# Patient Record
Sex: FEMALE | Race: WHITE | NOT HISPANIC OR LATINO | Employment: FULL TIME | URBAN - METROPOLITAN AREA
[De-identification: names, ages, dates, MRNs, and addresses within clinical notes are randomized per-mention and may not be internally consistent; named-entity substitution may affect disease eponyms.]

---

## 2020-10-07 ENCOUNTER — HOSPITAL ENCOUNTER (EMERGENCY)
Facility: HOSPITAL | Age: 25
Discharge: HOME | End: 2020-10-07
Attending: EMERGENCY MEDICINE | Admitting: EMERGENCY MEDICINE

## 2020-10-07 VITALS
WEIGHT: 190 LBS | SYSTOLIC BLOOD PRESSURE: 115 MMHG | TEMPERATURE: 97.5 F | OXYGEN SATURATION: 100 % | HEART RATE: 74 BPM | BODY MASS INDEX: 33.66 KG/M2 | RESPIRATION RATE: 18 BRPM | HEIGHT: 63 IN | DIASTOLIC BLOOD PRESSURE: 75 MMHG

## 2020-10-07 DIAGNOSIS — R55 SYNCOPE, UNSPECIFIED SYNCOPE TYPE: Primary | ICD-10-CM

## 2020-10-07 LAB
ALBUMIN SERPL-MCNC: 4.3 G/DL (ref 3.5–5.2)
ALBUMIN/GLOB SERPL: 1.4 G/DL
ALP SERPL-CCNC: 90 U/L (ref 39–117)
ALT SERPL W P-5'-P-CCNC: 23 U/L (ref 1–33)
ANION GAP SERPL CALCULATED.3IONS-SCNC: 12 MMOL/L (ref 5–15)
AST SERPL-CCNC: 18 U/L (ref 1–32)
B-HCG UR QL: NEGATIVE
BASOPHILS # BLD AUTO: 0.05 10*3/MM3 (ref 0–0.2)
BASOPHILS NFR BLD AUTO: 0.5 % (ref 0–1.5)
BILIRUB SERPL-MCNC: 0.5 MG/DL (ref 0–1.2)
BUN SERPL-MCNC: 8 MG/DL (ref 6–20)
BUN/CREAT SERPL: 10.3 (ref 7–25)
CALCIUM SPEC-SCNC: 9 MG/DL (ref 8.6–10.5)
CHLORIDE SERPL-SCNC: 105 MMOL/L (ref 98–107)
CO2 SERPL-SCNC: 21 MMOL/L (ref 22–29)
CREAT SERPL-MCNC: 0.78 MG/DL (ref 0.57–1)
DEPRECATED RDW RBC AUTO: 42.9 FL (ref 37–54)
EOSINOPHIL # BLD AUTO: 0.16 10*3/MM3 (ref 0–0.4)
EOSINOPHIL NFR BLD AUTO: 1.6 % (ref 0.3–6.2)
ERYTHROCYTE [DISTWIDTH] IN BLOOD BY AUTOMATED COUNT: 13 % (ref 12.3–15.4)
GFR SERPL CREATININE-BSD FRML MDRD: 92 ML/MIN/1.73
GLOBULIN UR ELPH-MCNC: 3 GM/DL
GLUCOSE SERPL-MCNC: 95 MG/DL (ref 65–99)
HCT VFR BLD AUTO: 45.5 % (ref 34–46.6)
HGB BLD-MCNC: 14.4 G/DL (ref 12–15.9)
HOLD SPECIMEN: NORMAL
HOLD SPECIMEN: NORMAL
IMM GRANULOCYTES # BLD AUTO: 0.04 10*3/MM3 (ref 0–0.05)
IMM GRANULOCYTES NFR BLD AUTO: 0.4 % (ref 0–0.5)
LYMPHOCYTES # BLD AUTO: 2.41 10*3/MM3 (ref 0.7–3.1)
LYMPHOCYTES NFR BLD AUTO: 24.6 % (ref 19.6–45.3)
MAGNESIUM SERPL-MCNC: 2 MG/DL (ref 1.6–2.6)
MCH RBC QN AUTO: 28.5 PG (ref 26.6–33)
MCHC RBC AUTO-ENTMCNC: 31.6 G/DL (ref 31.5–35.7)
MCV RBC AUTO: 89.9 FL (ref 79–97)
MONOCYTES # BLD AUTO: 0.6 10*3/MM3 (ref 0.1–0.9)
MONOCYTES NFR BLD AUTO: 6.1 % (ref 5–12)
NEUTROPHILS NFR BLD AUTO: 6.52 10*3/MM3 (ref 1.7–7)
NEUTROPHILS NFR BLD AUTO: 66.8 % (ref 42.7–76)
NRBC BLD AUTO-RTO: 0 /100 WBC (ref 0–0.2)
PLATELET # BLD AUTO: 263 10*3/MM3 (ref 140–450)
PMV BLD AUTO: 11 FL (ref 6–12)
POTASSIUM SERPL-SCNC: 4.1 MMOL/L (ref 3.5–5.2)
PROT SERPL-MCNC: 7.3 G/DL (ref 6–8.5)
RBC # BLD AUTO: 5.06 10*6/MM3 (ref 3.77–5.28)
SODIUM SERPL-SCNC: 138 MMOL/L (ref 136–145)
TROPONIN T SERPL-MCNC: <0.01 NG/ML (ref 0–0.03)
WBC # BLD AUTO: 9.78 10*3/MM3 (ref 3.4–10.8)
WHOLE BLOOD HOLD SPECIMEN: NORMAL
WHOLE BLOOD HOLD SPECIMEN: NORMAL

## 2020-10-07 PROCEDURE — 93005 ELECTROCARDIOGRAM TRACING: CPT | Performed by: EMERGENCY MEDICINE

## 2020-10-07 PROCEDURE — 81025 URINE PREGNANCY TEST: CPT | Performed by: EMERGENCY MEDICINE

## 2020-10-07 PROCEDURE — 80053 COMPREHEN METABOLIC PANEL: CPT | Performed by: EMERGENCY MEDICINE

## 2020-10-07 PROCEDURE — 84484 ASSAY OF TROPONIN QUANT: CPT | Performed by: EMERGENCY MEDICINE

## 2020-10-07 PROCEDURE — 85025 COMPLETE CBC W/AUTO DIFF WBC: CPT | Performed by: EMERGENCY MEDICINE

## 2020-10-07 PROCEDURE — 83735 ASSAY OF MAGNESIUM: CPT | Performed by: EMERGENCY MEDICINE

## 2020-10-07 PROCEDURE — 99283 EMERGENCY DEPT VISIT LOW MDM: CPT

## 2020-10-07 PROCEDURE — 93005 ELECTROCARDIOGRAM TRACING: CPT

## 2020-10-07 RX ORDER — SODIUM CHLORIDE 0.9 % (FLUSH) 0.9 %
10 SYRINGE (ML) INJECTION AS NEEDED
Status: DISCONTINUED | OUTPATIENT
Start: 2020-10-07 | End: 2020-10-07 | Stop reason: HOSPADM

## 2020-10-08 NOTE — ED PROVIDER NOTES
"Subjective   25-year-old female presents for evaluation following syncopal episode tonight.  Of note, the patient is a nurse upstairs.  She is otherwise healthy and denies any prior history of syncope or family history of sudden cardiac death.  Tonight, just after 7 PM she was receiving report when she got lightheaded and \"passed out.\"  She denies any preceding chest pain, shortness of breath, palpitations, or known triggers for her symptoms.  She was separately brought to the emergency department to be evaluated.  She did not hit her head or lose consciousness.  Currently, she states that she feels much better and is not complaining of any symptoms at this time.          Review of Systems   Neurological: Positive for syncope.   All other systems reviewed and are negative.      No past medical history on file.    No Known Allergies    No past surgical history on file.    No family history on file.    Social History     Socioeconomic History   • Marital status: Single     Spouse name: Not on file   • Number of children: Not on file   • Years of education: Not on file   • Highest education level: Not on file           Objective   Physical Exam  Vitals signs and nursing note reviewed.   Constitutional:       General: She is not in acute distress.     Appearance: Normal appearance. She is well-developed. She is not diaphoretic.      Comments: Well-appearing female in no acute distress   HENT:      Head: Normocephalic and atraumatic.   Eyes:      Pupils: Pupils are equal, round, and reactive to light.   Neck:      Musculoskeletal: Normal range of motion.      Comments: No midline cervical spine tenderness to palpation, no step-off or deformity noted  Cardiovascular:      Rate and Rhythm: Normal rate and regular rhythm.      Pulses: Normal pulses.      Heart sounds: Normal heart sounds. No murmur. No friction rub. No gallop.    Pulmonary:      Effort: Pulmonary effort is normal. No respiratory distress.      Breath " sounds: Normal breath sounds. No wheezing or rales.   Abdominal:      General: Bowel sounds are normal. There is no distension.      Palpations: Abdomen is soft. There is no mass.      Tenderness: There is no abdominal tenderness. There is no guarding or rebound.   Musculoskeletal: Normal range of motion.      Right lower leg: No edema.      Left lower leg: No edema.   Skin:     General: Skin is warm and dry.      Findings: No erythema or rash.   Neurological:      General: No focal deficit present.      Mental Status: She is alert and oriented to person, place, and time.      Comments: Awake, alert, and oriented x3, clear and fluent speech, no ataxia or dysmetria, normal gait, neurovascularly intact distally in all fours with bounding distal pulses and normal sensation, 5 out of 5 strength in all fours, no cranial nerve deficits noted with cranial nerves II through XII grossly intact   Psychiatric:         Mood and Affect: Mood normal.         Thought Content: Thought content normal.         Judgment: Judgment normal.         Procedures           ED Course  ED Course as of Oct 08 0003   Thu Oct 08, 2020   0001 25-year-old female presents for evaluation following syncopal episode tonight.  Of note, the patient is a nurse upstairs.  She denies any preceding chest pain, shortness of breath, palpitations, or other symptoms.  No prior history of syncope.  No family history of sudden cardiac death.  On arrival to the ED, the patient is nontoxic-appearing.  Vital signs are reassuring.  Initial EKG revealed normal sinus rhythm with a heart rate of 95 and no ST segments suggestive of or concerning for ischemia.  Normal NC and QT intervals noted with no EKG evidence of Brugada syndrome or hypertrophic cardiomyopathy.  No family history of sudden cardiac death.  Labs are unrevealing.  Doubt pulmonary embolism based on clinical presentation.  CHESS negative.  Patient observed in the emergency department and remained  asymptomatic with no arrhythmias noted on telemetry monitoring.  I feel that she is appropriate for discharge and prompt outpatient cardiology follow-up with our heart valve clinic for her syncopal episode.  She will follow-up within the next 72 hours.  Agreeable with plan and given appropriate strict return precautions.  Encouraged oral rehydration.    [DD]      ED Course User Index  [DD] Robin Clement MD                                 Recent Results (from the past 24 hour(s))   Pregnancy, Urine - Urine, Clean Catch    Collection Time: 10/07/20  8:08 PM    Specimen: Urine, Clean Catch   Result Value Ref Range    HCG, Urine QL Negative Negative   Comprehensive Metabolic Panel    Collection Time: 10/07/20  8:22 PM    Specimen: Blood   Result Value Ref Range    Glucose 95 65 - 99 mg/dL    BUN 8 6 - 20 mg/dL    Creatinine 0.78 0.57 - 1.00 mg/dL    Sodium 138 136 - 145 mmol/L    Potassium 4.1 3.5 - 5.2 mmol/L    Chloride 105 98 - 107 mmol/L    CO2 21.0 (L) 22.0 - 29.0 mmol/L    Calcium 9.0 8.6 - 10.5 mg/dL    Total Protein 7.3 6.0 - 8.5 g/dL    Albumin 4.30 3.50 - 5.20 g/dL    ALT (SGPT) 23 1 - 33 U/L    AST (SGOT) 18 1 - 32 U/L    Alkaline Phosphatase 90 39 - 117 U/L    Total Bilirubin 0.5 0.0 - 1.2 mg/dL    eGFR Non African Amer 92 >60 mL/min/1.73    Globulin 3.0 gm/dL    A/G Ratio 1.4 g/dL    BUN/Creatinine Ratio 10.3 7.0 - 25.0    Anion Gap 12.0 5.0 - 15.0 mmol/L   Magnesium    Collection Time: 10/07/20  8:22 PM    Specimen: Blood   Result Value Ref Range    Magnesium 2.0 1.6 - 2.6 mg/dL   Troponin    Collection Time: 10/07/20  8:22 PM    Specimen: Blood   Result Value Ref Range    Troponin T <0.010 0.000 - 0.030 ng/mL   Light Blue Top    Collection Time: 10/07/20  8:22 PM   Result Value Ref Range    Extra Tube hold for add-on    Green Top (Gel)    Collection Time: 10/07/20  8:22 PM   Result Value Ref Range    Extra Tube Hold for add-ons.    Lavender Top    Collection Time: 10/07/20  8:22 PM   Result Value  Ref Range    Extra Tube hold for add-on    Gold Top - SST    Collection Time: 10/07/20  8:22 PM   Result Value Ref Range    Extra Tube Hold for add-ons.    CBC Auto Differential    Collection Time: 10/07/20  8:22 PM    Specimen: Blood   Result Value Ref Range    WBC 9.78 3.40 - 10.80 10*3/mm3    RBC 5.06 3.77 - 5.28 10*6/mm3    Hemoglobin 14.4 12.0 - 15.9 g/dL    Hematocrit 45.5 34.0 - 46.6 %    MCV 89.9 79.0 - 97.0 fL    MCH 28.5 26.6 - 33.0 pg    MCHC 31.6 31.5 - 35.7 g/dL    RDW 13.0 12.3 - 15.4 %    RDW-SD 42.9 37.0 - 54.0 fl    MPV 11.0 6.0 - 12.0 fL    Platelets 263 140 - 450 10*3/mm3    Neutrophil % 66.8 42.7 - 76.0 %    Lymphocyte % 24.6 19.6 - 45.3 %    Monocyte % 6.1 5.0 - 12.0 %    Eosinophil % 1.6 0.3 - 6.2 %    Basophil % 0.5 0.0 - 1.5 %    Immature Grans % 0.4 0.0 - 0.5 %    Neutrophils, Absolute 6.52 1.70 - 7.00 10*3/mm3    Lymphocytes, Absolute 2.41 0.70 - 3.10 10*3/mm3    Monocytes, Absolute 0.60 0.10 - 0.90 10*3/mm3    Eosinophils, Absolute 0.16 0.00 - 0.40 10*3/mm3    Basophils, Absolute 0.05 0.00 - 0.20 10*3/mm3    Immature Grans, Absolute 0.04 0.00 - 0.05 10*3/mm3    nRBC 0.0 0.0 - 0.2 /100 WBC     Note: In addition to lab results from this visit, the labs listed above may include labs taken at another facility or during a different encounter within the last 24 hours. Please correlate lab times with ED admission and discharge times for further clarification of the services performed during this visit.    No orders to display     Vitals:    10/07/20 2048 10/07/20 2049 10/07/20 2100 10/07/20 2130   BP: 136/78  125/83 115/75   BP Location:       Patient Position:       Pulse:  99 89 74   Resp:       Temp:       TempSrc:       SpO2: 99% 100% 100% 100%   Weight:       Height:         Medications - No data to display  ECG/EMG Results (last 24 hours)     Procedure Component Value Units Date/Time    ECG 12 Lead [[id number]] Collected: 10/07/20 1956     Updated: 10/07/20 1958        ECG 12 Lead                        MDM    Final diagnoses:   Syncope, unspecified syncope type            Robin Clement MD  10/08/20 0004

## 2020-10-13 ENCOUNTER — HOSPITAL ENCOUNTER (OUTPATIENT)
Dept: CARDIOLOGY | Facility: HOSPITAL | Age: 25
Discharge: HOME | End: 2020-10-13

## 2020-10-13 ENCOUNTER — OFFICE VISIT (OUTPATIENT)
Dept: CARDIOLOGY | Facility: HOSPITAL | Age: 25
End: 2020-10-13

## 2020-10-13 VITALS
WEIGHT: 194.25 LBS | HEART RATE: 93 BPM | TEMPERATURE: 97.8 F | DIASTOLIC BLOOD PRESSURE: 72 MMHG | HEIGHT: 63 IN | SYSTOLIC BLOOD PRESSURE: 119 MMHG | OXYGEN SATURATION: 100 % | BODY MASS INDEX: 34.42 KG/M2 | RESPIRATION RATE: 23 BRPM

## 2020-10-13 DIAGNOSIS — R00.2 PALPITATIONS: ICD-10-CM

## 2020-10-13 DIAGNOSIS — R55 SYNCOPE AND COLLAPSE: Primary | ICD-10-CM

## 2020-10-13 DIAGNOSIS — R55 SYNCOPE AND COLLAPSE: ICD-10-CM

## 2020-10-13 PROCEDURE — 99213 OFFICE O/P EST LOW 20 MIN: CPT | Performed by: NURSE PRACTITIONER

## 2020-10-13 RX ORDER — DROSPIRENONE AND ETHINYL ESTRADIOL 0.02-3(28)
1 KIT ORAL DAILY
COMMUNITY
End: 2022-11-10

## 2020-10-13 NOTE — PROGRESS NOTES
[facility] Heart Monitor Documentation    Ledy TOOTIE Barrera  1995  [id number]  10/13/20    LAUREN Cardozo    [] ZIO XT Patch  Model [id number] Prescribed for N/A Days    · Serial Number: (N + 9 Digits) N   · Apply-By Date on Box:   · USPS Tracking Number:   · USPS Tracking        [x] [employer]ian MINI PLUS Mobile Cardiac Telemetry  Model BGMINIPLUS Prescribed for 30 Days    · Serial Number: (BGM + 7 Digits) [id number]  · Shipped-By Date on Box: 13015148  · UPS Tracking Number: [id number]  · UPS Tracking      [] [employer] BodyGuardian MINI Holter Monitor  Model BGMINIEL Prescribed for N/A Days    · Serial Number: (7 Digits)   · Shipped-By Date on Box:   · UPS Tracking Number: [id number]  · UPS Tracking        This monitor was applied to the patient's chest and checked for proper functioning.  Ms. Ledy Barrera was instructed in the proper use of this monitor.  She was given the opportunity to ask questions and left the office with the device 's instruction manual.    Sybil Mistry MA, 09:33 EDT, 10/13/20                  [facility] 8.8.2019

## 2020-10-13 NOTE — PROGRESS NOTES
[facility]  [facility]      10/13/2020         Ledy Barrera  [address] [address]  [unfilled]    1995    Provider, No Known    Ledy Barrera is a 25 y.o. female.      Subjective:     Chief Complaint:  Loss of Consciousness and Establish Care         HPI 25 year old female presents to the office today for ongoing evaluation of her recent syncopal spell. 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  She presented [facility] ED on 10/7/2020 after experiencing a syncopal spell during report.  She reports she was standing giving oncoming nurse report when she became lightheaded and passed out.  She denied any preceding chest pain, dyspnea, palpitations.  Syncopal spell was witnessed and patient did not lose consciousness or have loss of bowel or bladder.  Patient notes that she did not experience palpitations or racing heart prior to her syncopal spell but reports daily palpitations.  She describes them as a skipped beat or fluttering sensation.      Patient Active Problem List   Diagnosis   • Syncope and collapse   • Palpitations       History reviewed. No pertinent past medical history.    History reviewed. No pertinent surgical history.    Family History   Problem Relation Age of Onset   • Cancer Mother    • Diabetes Father    • Hypertension Father    • Other Father         NON ALCOHOL FATTY LIVER   • Hyperlipidemia Father    • Other Brother         SEIZURES   • No Known Problems Maternal Grandmother    • Stroke Maternal Grandfather    • Cirrhosis Paternal Grandmother    • Cancer Paternal Grandmother    • Cancer Paternal Grandfather        Social History     Socioeconomic History   • Marital status: Single     Spouse name: Not on file   • Number of children: Not on file   • Years of education: Not on file   • Highest education level: Not on file   Tobacco Use   • Smoking status: Never Smoker   • Smokeless tobacco: Never Used   Substance and Sexual  Activity   • Alcohol use: Not Currently   • Drug use: Defer   • Sexual activity: Defer   Social History Narrative    [employer], [employer]       No Known Allergies      Current Outpatient Medications:   •  drospirenone-ethinyl estradiol (YOSELYN,GIANVI) 3-0.02 MG per tablet, Take 1 tablet by mouth Daily., Disp: , Rfl:     The following portions of the patient's history were reviewed today and updated as appropriate: allergies, current medications, past family history, past medical history, past social history, past surgical history and problem list     Review of Systems   Constitution: Negative for chills, decreased appetite, diaphoresis, fever, malaise/fatigue, night sweats, weight gain and weight loss.   HENT: Negative for congestion, hearing loss, hoarse voice and nosebleeds.    Eyes: Negative for blurred vision, visual disturbance and visual halos.   Cardiovascular: Positive for palpitations and syncope. Negative for chest pain, claudication, cyanosis, dyspnea on exertion, irregular heartbeat, leg swelling, near-syncope, orthopnea and paroxysmal nocturnal dyspnea.   Respiratory: Negative for cough, hemoptysis, shortness of breath, sleep disturbances due to breathing, snoring, sputum production and wheezing.    Hematologic/Lymphatic: Negative for bleeding problem. Does not bruise/bleed easily.   Skin: Negative for dry skin, itching and rash.   Musculoskeletal: Negative for arthritis, falls, joint pain, joint swelling and myalgias.   Gastrointestinal: Negative for bloating, abdominal pain, constipation, diarrhea, flatus, heartburn, hematemesis, hematochezia, melena, nausea and vomiting.   Genitourinary: Negative for dysuria, frequency, hematuria, nocturia and urgency.   Neurological: Negative for excessive daytime sleepiness, dizziness, headaches, light-headedness, loss of balance and weakness.   Psychiatric/Behavioral: Negative for depression. The patient does not have insomnia and is not  "nervous/anxious.        Objective:     Vitals:    10/13/20 0843 10/13/20 0846 10/13/20 0847   BP: 122/75 118/74 119/72   BP Location: Right arm Left arm Left arm   Patient Position: Sitting Standing Sitting   Cuff Size: Adult Adult Adult   Pulse: 89 93 93   Resp:   23   Temp:   97.8 °F (36.6 °C)   TempSrc:   Temporal   SpO2: 100% 100% 100%   Weight:   88.1 kg (194 lb 4 oz)   Height:   160 cm (63\")       Body mass index is 34.41 kg/m².    Vitals signs and nursing note reviewed.   Constitutional:       General: Not in acute distress.     Appearance: Well-developed.   Eyes:      Conjunctiva/sclera: Conjunctivae normal.      Pupils: Pupils are equal, round, and reactive to light.   HENT:      Head: Normocephalic and atraumatic.   Neck:      Musculoskeletal: Normal range of motion and neck supple.      Thyroid: No thyromegaly.      Vascular: No JVD.      Trachea: No tracheal deviation.   Pulmonary:      Effort: Pulmonary effort is normal.      Breath sounds: Normal breath sounds.   Cardiovascular:      PMI at left midclavicular line. Normal rate. Regular rhythm. Normal S1. Normal S2.      Murmurs: There is no murmur.      No gallop. No click. No rub.   Pulses:     Intact distal pulses.   Edema:     Peripheral edema absent.   Abdominal:      General: Bowel sounds are normal. There is no distension.      Palpations: Abdomen is soft.      Tenderness: There is no abdominal tenderness.   Musculoskeletal: Normal range of motion.   Skin:     General: Skin is warm and dry.   Neurological:      Mental Status: Alert and oriented to person, place, and time.   Psychiatric:         Behavior: Behavior normal. Behavior is cooperative.         Lab and Diagnostic Review:  Results for orders placed or performed during the hospital encounter of 10/07/20   Comprehensive Metabolic Panel    Specimen: Blood   Result Value Ref Range    Glucose 95 65 - 99 mg/dL    BUN 8 6 - 20 mg/dL    Creatinine 0.78 0.57 - 1.00 mg/dL    Sodium 138 136 - 145 " mmol/L    Potassium 4.1 3.5 - 5.2 mmol/L    Chloride 105 98 - 107 mmol/L    CO2 21.0 (L) 22.0 - 29.0 mmol/L    Calcium 9.0 8.6 - 10.5 mg/dL    Total Protein 7.3 6.0 - 8.5 g/dL    Albumin 4.30 3.50 - 5.20 g/dL    ALT (SGPT) 23 1 - 33 U/L    AST (SGOT) 18 1 - 32 U/L    Alkaline Phosphatase 90 39 - 117 U/L    Total Bilirubin 0.5 0.0 - 1.2 mg/dL    eGFR Non African Amer 92 >60 mL/min/1.73    Globulin 3.0 gm/dL    A/G Ratio 1.4 g/dL    BUN/Creatinine Ratio 10.3 7.0 - 25.0    Anion Gap 12.0 5.0 - 15.0 mmol/L   Magnesium    Specimen: Blood   Result Value Ref Range    Magnesium 2.0 1.6 - 2.6 mg/dL   Troponin    Specimen: Blood   Result Value Ref Range    Troponin T <0.010 0.000 - 0.030 ng/mL   Pregnancy, Urine - Urine, Clean Catch    Specimen: Urine, Clean Catch   Result Value Ref Range    HCG, Urine QL Negative Negative   CBC Auto Differential    Specimen: Blood   Result Value Ref Range    WBC 9.78 3.40 - 10.80 10*3/mm3    RBC 5.06 3.77 - 5.28 10*6/mm3    Hemoglobin 14.4 12.0 - 15.9 g/dL    Hematocrit 45.5 34.0 - 46.6 %    MCV 89.9 79.0 - 97.0 fL    MCH 28.5 26.6 - 33.0 pg    MCHC 31.6 31.5 - 35.7 g/dL    RDW 13.0 12.3 - 15.4 %    RDW-SD 42.9 37.0 - 54.0 fl    MPV 11.0 6.0 - 12.0 fL    Platelets 263 140 - 450 10*3/mm3    Neutrophil % 66.8 42.7 - 76.0 %    Lymphocyte % 24.6 19.6 - 45.3 %    Monocyte % 6.1 5.0 - 12.0 %    Eosinophil % 1.6 0.3 - 6.2 %    Basophil % 0.5 0.0 - 1.5 %    Immature Grans % 0.4 0.0 - 0.5 %    Neutrophils, Absolute 6.52 1.70 - 7.00 10*3/mm3    Lymphocytes, Absolute 2.41 0.70 - 3.10 10*3/mm3    Monocytes, Absolute 0.60 0.10 - 0.90 10*3/mm3    Eosinophils, Absolute 0.16 0.00 - 0.40 10*3/mm3    Basophils, Absolute 0.05 0.00 - 0.20 10*3/mm3    Immature Grans, Absolute 0.04 0.00 - 0.05 10*3/mm3    nRBC 0.0 0.0 - 0.2 /100 WBC   Light Blue Top   Result Value Ref Range    Extra Tube hold for add-on    Green Top (Gel)   Result Value Ref Range    Extra Tube Hold for add-ons.    Lavender Top   Result Value Ref  Range    Extra Tube hold for add-on    Gold Top - SST   Result Value Ref Range    Extra Tube Hold for add-ons.      Ekg; NSR    Assessment and Plan:   1. Syncope and collapse  Encouraged good hydration and judicious salt intake  - Mobile Cardiac Outpatient Telemetry; Future  - Adult Transthoracic Echo Complete W/ Cont if Necessary Per Protocol; Future    2. Palpitations  Discussed avoidance of caffeine  - Mobile Cardiac Outpatient Telemetry; Future  - Adult Transthoracic Echo Complete W/ Cont if Necessary Per Protocol; Future            It has been a pleasure to participate in the care of this patient.  Patient was instructed to call the [facility] Center with any questions, concerns, or worsening symptoms.    *Please note that portions of this note were completed with a voice recognition program. Efforts were made to edit the dictations, but occasionally words are mistranscribed.

## 2020-12-03 PROCEDURE — 93272 ECG/REVIEW INTERPRET ONLY: CPT | Performed by: INTERNAL MEDICINE

## 2020-12-21 ENCOUNTER — IMMUNIZATION (OUTPATIENT)
Dept: VACCINE CLINIC | Facility: HOSPITAL | Age: 25
End: 2020-12-21

## 2020-12-21 PROCEDURE — 91300 HC SARSCOV02 VAC 30MCG/0.3ML IM: CPT | Performed by: PHYSICIAN ASSISTANT

## 2020-12-21 PROCEDURE — 0001A: CPT | Performed by: PHYSICIAN ASSISTANT

## 2021-01-11 ENCOUNTER — IMMUNIZATION (OUTPATIENT)
Dept: VACCINE CLINIC | Facility: HOSPITAL | Age: 26
End: 2021-01-11

## 2021-01-11 PROCEDURE — 0002A: CPT

## 2021-01-11 PROCEDURE — 0001A: CPT

## 2021-01-11 PROCEDURE — 91300 HC SARSCOV02 VAC 30MCG/0.3ML IM: CPT

## 2021-01-20 PROCEDURE — U0004 COV-19 TEST NON-CDC HGH THRU: HCPCS | Performed by: EMERGENCY MEDICINE

## 2021-01-22 ENCOUNTER — TELEMEDICINE (OUTPATIENT)
Dept: FAMILY MEDICINE CLINIC | Facility: TELEHEALTH | Age: 26
End: 2021-01-22

## 2021-01-22 DIAGNOSIS — Z76.89 RETURN TO WORK EVALUATION: Primary | ICD-10-CM

## 2021-01-22 PROCEDURE — BHEMPVIDEOVISIT: Performed by: NURSE PRACTITIONER

## 2021-01-22 NOTE — PROGRESS NOTES
CHIEF COMPLAINT  Chief Complaint   Patient presents with   • return to work         HPI  Ledy Barrera is a 25 y.o. female  presents with request to return to work. Was seen at  on 1/20/21, symptoms began on 1/18/21.  She was diagnosed with an acute URI and did have a negative COVID-19 test from that day.  She reports no symptoms and no fever at this time.    Review of Systems   All other systems reviewed and are negative.      No past medical history on file.    Family History   Problem Relation Age of Onset   • Cancer Mother    • Diabetes Father    • Hypertension Father    • Other Father         NON ALCOHOL FATTY LIVER   • Hyperlipidemia Father    • Other Brother         SEIZURES   • No Known Problems Maternal Grandmother    • Stroke Maternal Grandfather    • Cirrhosis Paternal Grandmother    • Cancer Paternal Grandmother    • Cancer Paternal Grandfather        Social History     Socioeconomic History   • Marital status: Single     Spouse name: Not on file   • Number of children: Not on file   • Years of education: Not on file   • Highest education level: Not on file   Tobacco Use   • Smoking status: Never Smoker   • Smokeless tobacco: Never Used   Substance and Sexual Activity   • Alcohol use: Not Currently   • Drug use: Never   • Sexual activity: Defer   Social History Narrative    [employer], [employer]         LMP 01/20/2021     PHYSICAL EXAM  Physical Exam   Constitutional: She is oriented to person, place, and time. She appears well-developed and well-nourished. She does not have a sickly appearance. She does not appear ill.   HENT:   Head: Normocephalic and atraumatic.   Neurological: She is alert and oriented to person, place, and time.       Results for orders placed or performed during the hospital encounter of 01/20/21   COVID-19 PCR, [employer] [facility], NP SWAB IN [place] VIRAL TRANSPORT MEDIA 24-30 HR TAT - Swab, Nasopharynx    Specimen: Nasopharynx; Swab   Result Value Ref Range     SARS-CoV-2 DARWIN Not Detected Not Detected   POCT Rapid Strep A    Specimen: Swab   Result Value Ref Range    Rapid Strep A Screen Negative Negative, VALID, INVALID, Not Performed    Internal Control Passed Passed    Lot Number [id number]     Expiration Date 02/22/2022    POCT Influenza A/B    Specimen: Swab   Result Value Ref Range    Rapid Influenza A Ag Negative Negative    Rapid Influenza B Ag Negative Negative    Internal Control Passed Passed    Lot Number [id number]     Expiration Date 05/07/2022        Diagnoses and all orders for this visit:    1. Return to work evaluation (Primary)  --denied RTW due to not having definitive alternate diagnosis; letter sent via [employer]  --continue 10 day quarantine through 1/28/21        FOLLOW-UP  As discussed during visit with PCP/[facility] if no improvement or Urgent Care/Emergency Department if worsening of symptoms    Patient verbalizes understanding of medication dosage, comfort measures, instructions for treatment and follow-up.    Gudelia Whitehead, APRN  01/22/2021  08:40 EST    This visit was performed via Telehealth.  This patient has been instructed to follow-up with their primary care provider if their symptoms worsen or the treatment provided does not resolve their illness.

## 2022-08-07 ENCOUNTER — APPOINTMENT (OUTPATIENT)
Dept: CARDIOLOGY | Facility: HOSPITAL | Age: 27
End: 2022-08-07

## 2022-08-07 ENCOUNTER — HOSPITAL ENCOUNTER (EMERGENCY)
Facility: HOSPITAL | Age: 27
Discharge: HOME | End: 2022-08-07
Attending: EMERGENCY MEDICINE | Admitting: EMERGENCY MEDICINE

## 2022-08-07 VITALS
TEMPERATURE: 99.7 F | RESPIRATION RATE: 16 BRPM | WEIGHT: 210 LBS | HEART RATE: 102 BPM | BODY MASS INDEX: 37.21 KG/M2 | DIASTOLIC BLOOD PRESSURE: 83 MMHG | HEIGHT: 63 IN | SYSTOLIC BLOOD PRESSURE: 133 MMHG | OXYGEN SATURATION: 99 %

## 2022-08-07 DIAGNOSIS — I80.8 SUPERFICIAL THROMBOPHLEBITIS OF LEFT UPPER EXTREMITY: Primary | ICD-10-CM

## 2022-08-07 LAB
BH CV UPPER VENOUS LEFT AXILLARY AUGMENT: NORMAL
BH CV UPPER VENOUS LEFT AXILLARY COMPRESS: NORMAL
BH CV UPPER VENOUS LEFT AXILLARY PHASIC: NORMAL
BH CV UPPER VENOUS LEFT AXILLARY SPONT: NORMAL
BH CV UPPER VENOUS LEFT BASILIC FOREARM COLOR: 1
BH CV UPPER VENOUS LEFT BASILIC FOREARM COMPRESS: NORMAL
BH CV UPPER VENOUS LEFT BASILIC UPPER COLOR: 1
BH CV UPPER VENOUS LEFT BASILIC UPPER COMPRESS: NORMAL
BH CV UPPER VENOUS LEFT BRACHIAL COMPRESS: NORMAL
BH CV UPPER VENOUS LEFT CEPHALIC FOREARM COMPRESS: NORMAL
BH CV UPPER VENOUS LEFT CEPHALIC UPPER COMPRESS: NORMAL
BH CV UPPER VENOUS LEFT INTERNAL JUGULAR AUGMENT: NORMAL
BH CV UPPER VENOUS LEFT INTERNAL JUGULAR COMPRESS: NORMAL
BH CV UPPER VENOUS LEFT INTERNAL JUGULAR PHASIC: NORMAL
BH CV UPPER VENOUS LEFT INTERNAL JUGULAR SPONT: NORMAL
BH CV UPPER VENOUS LEFT MED CUBITAL COMPRESS: NORMAL
BH CV UPPER VENOUS LEFT RADIAL COMPRESS: NORMAL
BH CV UPPER VENOUS LEFT SUBCLAVIAN AUGMENT: NORMAL
BH CV UPPER VENOUS LEFT SUBCLAVIAN COMPRESS: NORMAL
BH CV UPPER VENOUS LEFT SUBCLAVIAN PHASIC: NORMAL
BH CV UPPER VENOUS LEFT SUBCLAVIAN SPONT: NORMAL
BH CV UPPER VENOUS LEFT ULNAR COMPRESS: NORMAL
MAXIMAL PREDICTED HEART RATE: 196 BPM
STRESS TARGET HR: 167 BPM

## 2022-08-07 PROCEDURE — 93971 EXTREMITY STUDY: CPT | Performed by: INTERNAL MEDICINE

## 2022-08-07 PROCEDURE — 93971 EXTREMITY STUDY: CPT

## 2022-08-07 PROCEDURE — 99282 EMERGENCY DEPT VISIT SF MDM: CPT

## 2022-08-07 NOTE — ED PROVIDER NOTES
EMERGENCY DEPARTMENT ENCOUNTER    Pt Name: Ledy Barrera  MRN: [id number]  Pt :   1995  Room Number:  RW3/R3  Date of encounter:  2022  PCP: Provider, No Known  ED Provider: Jonny Larson PA-C    Historian: Patient      HPI:  Chief Complaint: Pain and swelling left forearm for 1 month        Context: Ledy Barrera is a 26 y.o. female who presents to the ED c/o pain and swelling to the medial aspect of her left forearm radiating into her upper arm for the past month.  Patient states she had an IV placed in her left forearm approximately 1 month ago.  No meds or fluids were flushed through this line.  Shortly thereafter she began experiencing pain to her left forearm that has now extended up above the elbow.  She denies chest pain pleuritic chest or back pain, shortness of breath, no hemoptysis.  No syncope presyncope dizziness lightheadedness or breathlessness.  No prior history of DVT or PE.  She takes Johanna oral contraceptives daily.  She is a non-smoker.  Past medical history is otherwise unremarkable.  She is allergic to benzoyl peroxide      PAST MEDICAL HISTORY  History reviewed. No pertinent past medical history.      PAST SURGICAL HISTORY  History reviewed. No pertinent surgical history.      FAMILY HISTORY  Family History   Problem Relation Age of Onset   • Cancer Mother    • Diabetes Father    • Hypertension Father    • Other Father         NON ALCOHOL FATTY LIVER   • Hyperlipidemia Father    • Other Brother         SEIZURES   • No Known Problems Maternal Grandmother    • Stroke Maternal Grandfather    • Cirrhosis Paternal Grandmother    • Cancer Paternal Grandmother    • Cancer Paternal Grandfather          SOCIAL HISTORY  Social History     Socioeconomic History   • Marital status: Single   Tobacco Use   • Smoking status: Never Smoker   • Smokeless tobacco: Never Used   Vaping Use   • Vaping Use: Never used   Substance and Sexual Activity   • Alcohol use: Not Currently   • Drug use:  Never   • Sexual activity: Yes     Partners: Male     Birth control/protection: Birth control pill         ALLERGIES  Benzoyl peroxide        REVIEW OF SYSTEMS  Review of Systems   Constitutional: Negative for activity change, appetite change, fatigue and fever.   HENT: Negative for congestion, rhinorrhea and sore throat.    Respiratory: Negative for cough, shortness of breath and wheezing.    Cardiovascular: Negative for chest pain, palpitations and leg swelling.   Gastrointestinal: Negative for abdominal pain, nausea and vomiting.   Musculoskeletal: Negative for arthralgias, back pain and neck pain.   Neurological: Negative for dizziness, syncope and light-headedness.          All systems reviewed and negative except for those discussed in HPI.       PHYSICAL EXAM    I have reviewed the triage vital signs and nursing notes.    ED Triage Vitals [08/07/22 1155]   Temp Heart Rate Resp BP SpO2   99.7 °F (37.6 °C) 102 16 133/83 99 %      Temp src Heart Rate Source Patient Position BP Location FiO2 (%)   Oral Monitor Sitting Left arm --       Physical Exam  GENERAL:   Pleasant awake alert and oriented nontoxic-appearing afebrile hemodynamic stable, not in acute distress.    HENT: Nares patent.  EYES: No scleral icterus.  CV: Regular rhythm, regular rate.  RESPIRATORY: Normal effort.  No audible wheezes, rales or rhonchi.  ABDOMEN: Soft, nontender  MUSCULOSKELETAL: No deformities.  She does have tender palpable cord along the distribution of the left basilic vein in the forearm and just above the elbow.  There also appears to be a small amount of erythema just above the left elbow along the distribution of the basilic vein.  No obvious soft tissue swelling or edema.  Pulses and sensation intact distally.  NEURO: Alert, moves all extremities, follows commands.  SKIN: Warm, dry, no rash visualized.        LAB RESULTS  Recent Results (from the past 24 hour(s))   Duplex Venous Upper Extremity - Left CAR    Collection Time:  08/07/22  1:25 PM   Result Value Ref Range    Target HR (85%) 167 bpm    Max. Pred. HR (100%) 196 bpm    Left Internal Jugular Augment Y     Left Internal Jugular Compress C     Left Internal Jugular Phasic Y     Left Internal Jugular Spont Y     Left Subclavian Augment Y     Left Subclavian Compress C     Left Subclavian Phasic Y     Left Subclavian Spont Y     Left Axillary Augment Y     Left Axillary Compress C     Left Axillary Phasic Y     Left Axillary Spont Y     Left Brachial Compress C     Left Radial Compress C     Left Ulnar Compress C     Left Basilic Upper Compress N     Left Basilic Upper Color 1     Left Basilic Forearm Compress N     Left Basilic Forearm Color 1     Left Cephalic Upper Compress C     Left Cephalic Forearm Compress C     UPPER VENOUS LEFT MED CUBITAL COMPRESS C        If labs were ordered, I independently reviewed the results.        RADIOLOGY  Duplex Venous Upper Extremity - Left CAR    Result Date: 8/7/2022  · Left upper extremity superficial thrombophlebitis noted in the basilic (upper arm) and basilic (forearm). · All other left sided vessels appear negative for DVT and SVT.            PROCEDURES    Procedures    No orders to display       MEDICATIONS GIVEN IN ER    Medications - No data to display      PROGRESS, DATA ANALYSIS, CONSULTS, AND MEDICAL DECISION MAKING    All labs have been independently reviewed by me.  All radiology studies have been reviewed by me and the radiologist dictating the report.   EKG's have been independently viewed and interpreted by me.      Differential diagnoses: DVT, SVT, cellulitis           She has an SVT of the forearm basilic and upper arm basilic vein.  Due to the chronicity of her symptoms, as well as current oral contraceptive use, recommend Eliquis for effective anticoagulation.  She was discharged with a starter pack prescription as well as a coupon for free 30-day trial.  She is to return in 1 week for repeat ultrasound to evaluate  progress of anticoagulant.  She has no primary care provider, she was referred to patient connection for establishment of PCP and follow-up.  She is to return if any change or worsening symptoms.  She verbalizes understanding and is agreeable to plan.      AS OF 20:15 EDT VITALS:    BP - 133/83  HR - 102  TEMP - 99.7 °F (37.6 °C) (Oral)  O2 SATS - 99%                  DIAGNOSIS  Final diagnoses:   Superficial thrombophlebitis of left upper extremity         DISPOSITION  DISCHARGE    Patient discharged in stable condition.    Reviewed implications of results, diagnosis, meds, responsibility to follow up, warning signs and symptoms of possible worsening, potential complications and reasons to return to ER.    Patient/Family voiced understanding of above instructions.    Discussed plan for discharge, as there is no emergent indication for admission.  Pt/family is agreeable and understands need for follow up and possible repeat testing.  Pt/family is aware that discharge does not mean that nothing is wrong but that it indicates no emergency is currently present that requires admission and they must continue care with follow-up as given below or with a physician of their choice.     FOLLOW-UP  [facility]IENT [facility] - [place] [id number] [phone]  Call   To arrange primary care provider.         Medication List      New Prescriptions    Apixaban Starter Pack tablet therapy pack  Take two 5 mg tablets by mouth every 12 hours for 7 days. Followed by one 5 mg tablet every 12 hours. (Dispense starter pack if available)           Where to Get Your Medications      These medications were sent to [id number] - [place], [place] - [address] AT [facility] [address] - [phone]  - [phone] FX  [address] [place] [address]    Phone: [phone]   · Apixaban Starter Pack tablet therapy pack                  Jonny Larson PA-C  08/07/22 2017

## 2022-08-07 NOTE — DISCHARGE INSTRUCTIONS
Warm compresses every few hours for 20 to 30 minutes.  Recommend repeat ultrasound in 1 week to reevaluate size of thrombosis.  Return to the emergency department immediately if any change or worsening of symptoms.

## 2022-09-17 ENCOUNTER — HOSPITAL ENCOUNTER (EMERGENCY)
Facility: HOSPITAL | Age: 27
Discharge: HOME | End: 2022-09-18
Attending: EMERGENCY MEDICINE

## 2022-09-17 DIAGNOSIS — Z92.0 HISTORY OF ORAL CONTRACEPTIVE USE: ICD-10-CM

## 2022-09-17 DIAGNOSIS — I82.612 SUPERFICIAL VENOUS THROMBOSIS OF LEFT UPPER EXTREMITY: ICD-10-CM

## 2022-09-17 DIAGNOSIS — R20.8 BURNING SENSATION OF LOWER EXTREMITY: Primary | ICD-10-CM

## 2022-09-17 DIAGNOSIS — M54.50 CHRONIC BILATERAL LOW BACK PAIN WITHOUT SCIATICA: ICD-10-CM

## 2022-09-17 DIAGNOSIS — G89.29 CHRONIC BILATERAL LOW BACK PAIN WITHOUT SCIATICA: ICD-10-CM

## 2022-09-17 PROCEDURE — 96372 THER/PROPH/DIAG INJ SC/IM: CPT

## 2022-09-17 PROCEDURE — 99283 EMERGENCY DEPT VISIT LOW MDM: CPT

## 2022-09-18 ENCOUNTER — HOSPITAL ENCOUNTER (OUTPATIENT)
Dept: CARDIOLOGY | Facility: HOSPITAL | Age: 27
Discharge: HOME | End: 2022-09-18

## 2022-09-18 ENCOUNTER — APPOINTMENT (OUTPATIENT)
Dept: CT IMAGING | Facility: HOSPITAL | Age: 27
End: 2022-09-18

## 2022-09-18 VITALS
OXYGEN SATURATION: 98 % | SYSTOLIC BLOOD PRESSURE: 125 MMHG | TEMPERATURE: 97.8 F | HEIGHT: 63 IN | BODY MASS INDEX: 37.21 KG/M2 | HEART RATE: 90 BPM | RESPIRATION RATE: 16 BRPM | DIASTOLIC BLOOD PRESSURE: 77 MMHG | WEIGHT: 210 LBS

## 2022-09-18 VITALS — HEIGHT: 63 IN | WEIGHT: 210 LBS | BODY MASS INDEX: 37.21 KG/M2

## 2022-09-18 DIAGNOSIS — I82.612 SUPERFICIAL VENOUS THROMBOSIS OF LEFT UPPER EXTREMITY: ICD-10-CM

## 2022-09-18 DIAGNOSIS — Z92.0 HISTORY OF ORAL CONTRACEPTIVE USE: ICD-10-CM

## 2022-09-18 DIAGNOSIS — R20.8 BURNING SENSATION OF LOWER EXTREMITY: ICD-10-CM

## 2022-09-18 LAB
B-HCG UR QL: NEGATIVE
BH CV LOWER VASCULAR LEFT COMMON FEMORAL AUGMENT: NORMAL
BH CV LOWER VASCULAR LEFT COMMON FEMORAL COMPRESS: NORMAL
BH CV LOWER VASCULAR LEFT COMMON FEMORAL PHASIC: NORMAL
BH CV LOWER VASCULAR LEFT COMMON FEMORAL SPONT: NORMAL
BH CV LOWER VASCULAR LEFT SAPHENOFEMORAL JUNCTION COMPRESS: NORMAL
BH CV LOWER VASCULAR RIGHT COMMON FEMORAL AUGMENT: NORMAL
BH CV LOWER VASCULAR RIGHT COMMON FEMORAL COMPRESS: NORMAL
BH CV LOWER VASCULAR RIGHT COMMON FEMORAL PHASIC: NORMAL
BH CV LOWER VASCULAR RIGHT COMMON FEMORAL SPONT: NORMAL
BH CV LOWER VASCULAR RIGHT DISTAL FEMORAL AUGMENT: NORMAL
BH CV LOWER VASCULAR RIGHT DISTAL FEMORAL COMPRESS: NORMAL
BH CV LOWER VASCULAR RIGHT DISTAL FEMORAL PHASIC: NORMAL
BH CV LOWER VASCULAR RIGHT DISTAL FEMORAL SPONT: NORMAL
BH CV LOWER VASCULAR RIGHT GASTRONEMIUS COMPRESS: NORMAL
BH CV LOWER VASCULAR RIGHT GREATER SAPH AK COMPRESS: NORMAL
BH CV LOWER VASCULAR RIGHT GREATER SAPH BK COMPRESS: NORMAL
BH CV LOWER VASCULAR RIGHT LESSER SAPH COMPRESS: NORMAL
BH CV LOWER VASCULAR RIGHT MID FEMORAL AUGMENT: NORMAL
BH CV LOWER VASCULAR RIGHT MID FEMORAL COMPRESS: NORMAL
BH CV LOWER VASCULAR RIGHT MID FEMORAL PHASIC: NORMAL
BH CV LOWER VASCULAR RIGHT MID FEMORAL SPONT: NORMAL
BH CV LOWER VASCULAR RIGHT PERONEAL AUGMENT: NORMAL
BH CV LOWER VASCULAR RIGHT PERONEAL COMPRESS: NORMAL
BH CV LOWER VASCULAR RIGHT POPLITEAL AUGMENT: NORMAL
BH CV LOWER VASCULAR RIGHT POPLITEAL COMPRESS: NORMAL
BH CV LOWER VASCULAR RIGHT POPLITEAL PHASIC: NORMAL
BH CV LOWER VASCULAR RIGHT POPLITEAL SPONT: NORMAL
BH CV LOWER VASCULAR RIGHT POSTERIOR TIBIAL AUGMENT: NORMAL
BH CV LOWER VASCULAR RIGHT POSTERIOR TIBIAL COMPRESS: NORMAL
BH CV LOWER VASCULAR RIGHT PROFUNDA FEMORAL AUGMENT: NORMAL
BH CV LOWER VASCULAR RIGHT PROFUNDA FEMORAL PHASIC: NORMAL
BH CV LOWER VASCULAR RIGHT PROFUNDA FEMORAL SPONT: NORMAL
BH CV LOWER VASCULAR RIGHT PROXIMAL FEMORAL AUGMENT: NORMAL
BH CV LOWER VASCULAR RIGHT PROXIMAL FEMORAL COMPRESS: NORMAL
BH CV LOWER VASCULAR RIGHT PROXIMAL FEMORAL PHASIC: NORMAL
BH CV LOWER VASCULAR RIGHT PROXIMAL FEMORAL SPONT: NORMAL
BH CV LOWER VASCULAR RIGHT SAPHENOFEMORAL JUNCTION AUGMENT: NORMAL
BH CV LOWER VASCULAR RIGHT SAPHENOFEMORAL JUNCTION COMPRESS: NORMAL
BH CV LOWER VASCULAR RIGHT SAPHENOFEMORAL JUNCTION PHASIC: NORMAL
BH CV LOWER VASCULAR RIGHT SAPHENOFEMORAL JUNCTION SPONT: NORMAL
EXPIRATION DATE: NORMAL
INTERNAL NEGATIVE CONTROL: NEGATIVE
INTERNAL POSITIVE CONTROL: POSITIVE
Lab: NORMAL
MAXIMAL PREDICTED HEART RATE: 196 BPM
STRESS TARGET HR: 167 BPM

## 2022-09-18 PROCEDURE — 93971 EXTREMITY STUDY: CPT

## 2022-09-18 PROCEDURE — 93971 EXTREMITY STUDY: CPT | Performed by: INTERNAL MEDICINE

## 2022-09-18 PROCEDURE — 72131 CT LUMBAR SPINE W/O DYE: CPT

## 2022-09-18 PROCEDURE — 81025 URINE PREGNANCY TEST: CPT | Performed by: EMERGENCY MEDICINE

## 2022-09-18 PROCEDURE — 25010000002 ENOXAPARIN PER 10 MG: Performed by: PHYSICIAN ASSISTANT

## 2022-09-18 RX ORDER — ENOXAPARIN SODIUM 100 MG/ML
1 INJECTION SUBCUTANEOUS ONCE
Status: COMPLETED | OUTPATIENT
Start: 2022-09-18 | End: 2022-09-18

## 2022-09-18 RX ADMIN — ENOXAPARIN SODIUM 100 MG: 100 INJECTION SUBCUTANEOUS at 01:58

## 2022-09-18 NOTE — ED PROVIDER NOTES
Subjective   History of Present Illness  This is a 26-year-old female that presents the ER with burning sensation to the right anterior lower leg that starts below the right knee and does not go below the right ankle.  Patient denies any swelling to the right lower extremity or evidence of erythema or warmth.  Patient reports history of superficial venous thrombosis to the basilic vein in the left upper extremity from 8/7/2022.  Thrombus extended from the left forearm to the left upper arm.  There is no evidence of DVT.  Patient denies personal history of clotting disorder or family history of clotting disorder.  Patient is a nurse here at [employer] and says that she allowed a nursing student to start an IV on her left upper extremity and then subsequently developed the SVT.  Patient has past medical history significant for obesity with BMI of 37.  She reports recurrent low back pain all across her low back without radiation to lower extremities.  She denies any urinary or bowel changes or incontinence.  Last menstrual period was in January, 2022.  Menses are regular secondary to taking OCP, Johanna.  Patient says that burning sensation to the right lower leg is constant in nature and very uncomfortable.  She denies any other numbness, tingling, or burning sensation to the rest of her body.  No other complaints at this time.    History provided by:  Patient  Leg Pain  Location:  Leg  Time since incident:  3 days  Injury: no    Leg location:  R upper leg  Pain details:     Quality:  Burning    Duration:  3 days    Timing:  Constant  Chronicity:  New  Dislocation: no    Tetanus status:  Up to date  Prior injury to area:  No  Relieved by:  Nothing  Worsened by:  Nothing  Ineffective treatments:  None tried  Associated symptoms: back pain (Pain all across the lower back.  No radiation to lower extremities.)    Associated symptoms: no decreased ROM, no fatigue, no fever, no itching, no muscle weakness, no neck  pain, no numbness, no stiffness, no swelling and no tingling    Risk factors comment:  History of SVT to left basilic vein diagnosed on 8/7/22.  Pt on Eliquis x 30 days and hasn't followed up yet with PCP.       Review of Systems   Constitutional: Negative.  Negative for fatigue and fever.   HENT:        Pt just got over Covid-19 at the end of 8/2022.  UTD on Pfizer.  This recent episode was the 3rd time patient has had Covid-19.   Respiratory: Negative.  Negative for shortness of breath.    Cardiovascular: Negative.    Gastrointestinal: Negative.    Genitourinary: Negative.  Negative for enuresis, flank pain, frequency and urgency.        LMP: 1/22.  Pt on Johanna and menses are very irregular with this OCP.  No urinary changes including incontinence.   Musculoskeletal: Positive for back pain (Pain all across the lower back.  No radiation to lower extremities.). Negative for gait problem, neck pain and stiffness.        Constant burning sensation to right anterior lower leg.   Skin: Negative.  Negative for color change, itching and wound.   Neurological: Negative.  Negative for numbness.   Hematological: Does not bruise/bleed easily.        History of superficial thrombus to basilic vein to left upper extremity on 8/7/22.   All other systems reviewed and are negative.      No past medical history on file.    Allergies   Allergen Reactions   • Benzoyl Peroxide Other (See Comments)     Swelling of face and mouth       No past surgical history on file.    Family History   Problem Relation Age of Onset   • Cancer Mother    • Diabetes Father    • Hypertension Father    • Other Father         NON ALCOHOL FATTY LIVER   • Hyperlipidemia Father    • Other Brother         SEIZURES   • No Known Problems Maternal Grandmother    • Stroke Maternal Grandfather    • Cirrhosis Paternal Grandmother    • Cancer Paternal Grandmother    • Cancer Paternal Grandfather        Social History     Socioeconomic History   • Marital status: Single    Tobacco Use   • Smoking status: Never Smoker   • Smokeless tobacco: Never Used   Vaping Use   • Vaping Use: Never used   Substance and Sexual Activity   • Alcohol use: Not Currently   • Drug use: Never   • Sexual activity: Yes     Partners: Male     Birth control/protection: Birth control pill           Objective   Physical Exam  Vitals and nursing note reviewed.   Constitutional:       General: She is not in acute distress.     Appearance: Normal appearance. She is obese. She is not ill-appearing or diaphoretic.      Comments: Patient resting comfortably.  No acute sign of pain or distress.   HENT:      Head: Normocephalic and atraumatic.      Nose: Nose normal.      Mouth/Throat:      Mouth: Mucous membranes are moist.      Pharynx: Oropharynx is clear.   Eyes:      Extraocular Movements: Extraocular movements intact.      Conjunctiva/sclera: Conjunctivae normal.      Pupils: Pupils are equal, round, and reactive to light.   Cardiovascular:      Rate and Rhythm: Normal rate and regular rhythm.  No extrasystoles are present.     Pulses: Normal pulses.           Dorsalis pedis pulses are 2+ on the right side and 2+ on the left side.        Posterior tibial pulses are 2+ on the right side and 2+ on the left side.      Heart sounds: Normal heart sounds.      Comments: Regular rate and rhythm.  No ectopy.  No pedal edema to lower extremities.  Strong bilateral DP and PT pulses and brisk capillary refill.  There is no erythema or warmth to the right lower extremity.  Negative Homans' sign bilaterally.  Pulmonary:      Effort: Pulmonary effort is normal. No tachypnea or accessory muscle usage.      Breath sounds: Normal breath sounds.      Comments: Lungs are clear to auscultation bilaterally  Abdominal:      General: Bowel sounds are normal. There is no distension.      Palpations: Abdomen is soft.      Tenderness: There is no abdominal tenderness. There is no right CVA tenderness, left CVA tenderness, guarding or  rebound.      Comments: Abdomen soft and nontender   Musculoskeletal:         General: Normal range of motion.      Cervical back: Normal range of motion and neck supple.      Lumbar back: Tenderness present. No swelling, edema, signs of trauma or spasms. Normal range of motion.      Right lower leg: No edema.      Left lower leg: No edema.      Comments: Tenderness to palpation all along the low back, including mid lower lumbar spine and bilateral paraspinal musculature.  No pelvic or hip tenderness.  No tenderness to the bilateral buttocks.   Skin:     General: Skin is warm and dry.   Neurological:      General: No focal deficit present.      Mental Status: She is alert.         Procedures           ED Course  ED Course as of 09/18/22 0405   Sun Sep 18, 2022   0355 Urine pregnancy is negative.  CT of the lumbar spine without contrast reveals no acute lumbar spine abnormality.  No evidence of disc herniation, spinal canal stenosis, or neural foraminal stenosis.  Patient was concerned about DVT to right lower extremity with history of SVT to left upper arm on 8/7/2022.  Discussed the case with Dr. Acevedo, ER attending physician.  We covered patient with Lovenox at 1 mg/kg subcutaneous here in the ER and we will bring her back in the morning for ultrasound to rule out DVT in the right leg.  Exam is not concerning for DVT and patient has strong right DP and PT pulses and brisk capillary refill.  I advised patient to follow-up closely with PCP regarding burning sensation to the right lower extremity.  She verbalized understanding.  I did offer her anti-inflammatory here in the ER, but she deferred. [FC]      ED Course User Index  [FC] Fiorella Aldridge PA-C            Recent Results (from the past 24 hour(s))   POC Urine Pregnancy    Collection Time: 09/18/22  2:34 AM    Specimen: Urine   Result Value Ref Range    HCG, Urine, QL Negative Negative    Lot Number [id number]     Internal Positive Control Positive Positive,  Passed    Internal Negative Control Negative Negative, Passed    Expiration Date 2023-09-30      Note: In addition to lab results from this visit, the labs listed above may include labs taken at another facility or during a different encounter within the last 24 hours. Please correlate lab times with ED admission and discharge times for further clarification of the services performed during this visit.    CT Lumbar Spine Without Contrast   Final Result   Addendum 1 of 1   Addendum:      Dictation error in the original report. The report should state mild facet    arthrosis bilaterally at L4-L5 and L5-S1.      Electronically signed by:  Robin Constantino M.D.     9/18/2022 1:29 AM      Final      No acute lumbar spine abnormality. No evidence of disc herniation, spinal canal stenosis or neural foraminal stenosis.            This examination was interpreted by Robin Constantino M.D.       Electronically signed by:  Robin Constantino M.D.     9/18/2022 1:10 AM Mountain Time        Vitals:    09/18/22 0100 09/18/22 0130 09/18/22 0200 09/18/22 0226   BP: 127/89 126/79 114/67    Pulse: 101 99 93 96   Resp:       Temp:       TempSrc:       SpO2:       Weight:       Height:         Medications   Enoxaparin Sodium (LOVENOX) syringe 100 mg (100 mg Subcutaneous Given 9/18/22 0158)     ECG/EMG Results (last 24 hours)     ** No results found for the last 24 hours. **        No orders to display                                         MDM    Final diagnoses:   Burning sensation of lower extremity   Chronic bilateral low back pain without sciatica   Superficial venous thrombosis of left upper extremity   History of oral contraceptive use       ED Disposition  ED Disposition     ED Disposition   Discharge    Condition   Stable    Comment   --             Jin Ibrahim MD  [address] [place] [id number]  [phone]    Call in 2 days  Call Monday for first available recheck to evaluate further for any  type of clotting disorder    [facility] Emergency Department  [address] [address] [phone]    If symptoms worsen         Medication List      Stop    Apixaban Starter Pack tablet therapy pack             Fiorella Aldridge, EARNEST  09/18/22 1382

## 2022-09-18 NOTE — DISCHARGE INSTRUCTIONS
Patient has history of superficial venous thrombus to left upper extremity on 8/7/2022.  Patient completed 30 days of Eliquis.  With increased burning sensation to right lower extremity, we will set patient up for outpatient ultrasound of right lower extremity tomorrow to rule out any DVT.  We gave Lovenox injection at 1 mg/kg subcutaneous in the ER.  CT of the lumbar spine revealed no evidence of spinal canal narrowing, herniated disc, or other acute process.  Recommend close PCP follow-up for recheck regarding burning sensation to right lower extremity.  We also gave follow-up information for hematology to further assess and rule out any type of clotting disorder.  Return to the ER if any worsening symptoms.

## 2022-09-29 ENCOUNTER — OFFICE VISIT (OUTPATIENT)
Dept: INTERNAL MEDICINE | Facility: CLINIC | Age: 27
End: 2022-09-29

## 2022-09-29 VITALS
OXYGEN SATURATION: 99 % | HEIGHT: 63 IN | DIASTOLIC BLOOD PRESSURE: 80 MMHG | SYSTOLIC BLOOD PRESSURE: 120 MMHG | WEIGHT: 216 LBS | BODY MASS INDEX: 38.27 KG/M2 | HEART RATE: 113 BPM | TEMPERATURE: 96.9 F

## 2022-09-29 DIAGNOSIS — R00.2 PALPITATIONS: ICD-10-CM

## 2022-09-29 DIAGNOSIS — Z00.00 ANNUAL PHYSICAL EXAM: ICD-10-CM

## 2022-09-29 DIAGNOSIS — E66.9 CLASS 2 OBESITY WITHOUT SERIOUS COMORBIDITY WITH BODY MASS INDEX (BMI) OF 38.0 TO 38.9 IN ADULT, UNSPECIFIED OBESITY TYPE: ICD-10-CM

## 2022-09-29 DIAGNOSIS — I82.602 ARM VEIN BLOOD CLOT, LEFT: Primary | ICD-10-CM

## 2022-09-29 DIAGNOSIS — Z30.09 BIRTH CONTROL COUNSELING: ICD-10-CM

## 2022-09-29 DIAGNOSIS — M54.16 LUMBAR RADICULOPATHY: ICD-10-CM

## 2022-09-29 PROBLEM — E66.812 CLASS 2 OBESITY WITHOUT SERIOUS COMORBIDITY WITH BODY MASS INDEX (BMI) OF 38.0 TO 38.9 IN ADULT: Status: ACTIVE | Noted: 2022-09-29

## 2022-09-29 PROCEDURE — 99214 OFFICE O/P EST MOD 30 MIN: CPT | Performed by: INTERNAL MEDICINE

## 2022-09-29 PROCEDURE — 99385 PREV VISIT NEW AGE 18-39: CPT | Performed by: INTERNAL MEDICINE

## 2022-09-29 NOTE — PROGRESS NOTES
Subjective   Ledy Barrera is a 27 y.o. female and is here for a comprehensive physical exam.     Do you take any herbs or supplements that were not prescribed by a doctor? no  Are you taking calcium supplements? no  Are you taking aspirin daily? no      The following portions of the patient's history were reviewed and updated as appropriate: allergies, current medications, past family history, past medical history, past social history, past surgical history and problem list.    Patient Active Problem List   Diagnosis   • Syncope and collapse   • Palpitations   • Arm vein blood clot, left   • Birth control counseling   • Lumbar radiculopathy   • Class 2 obesity without serious comorbidity with body mass index (BMI) of 38.0 to 38.9 in adult       Review of Systems   Constitutional: Negative.    HENT: Negative.    Eyes: Negative.    Respiratory: Negative.    Cardiovascular: Negative.    Gastrointestinal: Negative.    Endocrine: Negative.    Genitourinary: Negative.    Musculoskeletal: Negative.    Skin: Negative.    Allergic/Immunologic: Negative.    Neurological: Positive for numbness.        Right lower leg burning lat aspect    Hematological: Negative.    Psychiatric/Behavioral: Negative.        Physical Exam  Constitutional:       Appearance: Normal appearance. She is well-developed. She is obese.   HENT:      Head: Normocephalic and atraumatic.      Right Ear: Tympanic membrane, ear canal and external ear normal.      Left Ear: Tympanic membrane, ear canal and external ear normal.      Nose: Nose normal.      Mouth/Throat:      Mouth: Mucous membranes are moist.      Pharynx: Oropharynx is clear.   Eyes:      Conjunctiva/sclera: Conjunctivae normal.      Pupils: Pupils are equal, round, and reactive to light.   Cardiovascular:      Rate and Rhythm: Normal rate and regular rhythm.      Heart sounds: Normal heart sounds.   Pulmonary:      Effort: Pulmonary effort is normal.      Breath sounds: Normal breath  sounds.   Abdominal:      General: Bowel sounds are normal.      Palpations: Abdomen is soft.   Genitourinary:     Vagina: Normal.   Musculoskeletal:         General: Normal range of motion.      Cervical back: Normal range of motion and neck supple.   Skin:     General: Skin is warm and dry.   Neurological:      Mental Status: She is alert and oriented to person, place, and time.      Deep Tendon Reflexes: Reflexes are normal and symmetric.   Psychiatric:         Mood and Affect: Mood normal.         Behavior: Behavior normal.         Thought Content: Thought content normal.         Judgment: Judgment normal.         All  tests have been reviewed.    Assessment & Plan          1. Patient Counseling:  --Nutrition: Stressed importance of moderation in sodium/caffeine intake, saturated fat and cholesterol, caloric balance, sufficient intake of fresh fruits, vegetables, fiber, calcium and iron.  --Exercise: Stressed the importance of regular exercise.   --Injury prevention: Discussed safety belts, safety helmets, smoke detector, smoking near bedding or upholstery.   --Dental health: Discussed importance of regular tooth brushing, flossing, and dental visits.  --Immunizations reviewed.  --Discussed benefits of screening colonoscopy.  --After hours service discussed with patient    2. Discussed the patient's BMI with her.    Body mass index is 38.26 kg/m².  Class 2 Severe Obesity (BMI >=35 and <=39.9). Obesity-related health conditions include the following: none. Obesity is newly identified. BMI is is above average; BMI management plan is completed. We discussed portion control and increasing exercise.

## 2022-09-29 NOTE — PROGRESS NOTES
Subjective   Ledy Barrera is a 27 y.o. female.     Chief Complaint   Patient presents with   • Establish Care   • Annual Exam   • Coagulation Disorder     Recurrent blood clots        History of Present Illness   Patient here to establish care also physical.  A month ago developed left upper arm superficial vein thrombosis patient was put on Eliquis a week ago developed right lower leg burning sensation CT scan of L-spine no significant pathology.  Right leg ultrasound showed no clot.  Patient is Eliquis was discontinued by the emergency room doctor.  Patient is left upper arm clot was provoked  Venous puncture.  Patient states after the venous puncture local area developed erythema and swelling.  Patient also has a low back pain chronically.  Weight is elevated.  History of palpitation Holter monitor unremarkable per patient but echocardiogram unable to do due to insurance at that time    Current Outpatient Medications:   •  drospirenone-ethinyl estradiol (YOSELYN,GIANVI) 3-0.02 MG per tablet, Take 1 tablet by mouth Daily., Disp: , Rfl:     The following portions of the patient's history were reviewed and updated as appropriate: allergies, current medications, past family history, past medical history, past social history, past surgical history and problem list.    Review of Systems   Constitutional: Negative.    Respiratory: Negative.    Cardiovascular: Negative.    Gastrointestinal: Negative.    Musculoskeletal: Negative.    Skin: Negative.    Neurological: Positive for numbness.   Psychiatric/Behavioral: Negative.        Objective   Physical Exam  Cardiovascular:      Rate and Rhythm: Normal rate and regular rhythm.      Heart sounds: Normal heart sounds.   Pulmonary:      Effort: Pulmonary effort is normal.      Breath sounds: Normal breath sounds.   Abdominal:      General: Bowel sounds are normal.   Musculoskeletal:      Cervical back: Neck supple.   Skin:     General: Skin is warm.   Neurological:       Mental Status: She is alert and oriented to person, place, and time.   Psychiatric:         Behavior: Behavior normal.         All tests have been reviewed.    Assessment & Plan   Diagnoses and all orders for this visit:    Arm vein blood clot, left  -     Ambulatory Referral to Hematology  -     Duplex Venous Upper Extremity - Left CAR  -     Protein C & S Antigens  -     Antithrombin Panel  -     Factor 5 Leiden  -     aPTT  -     Protime-INR  -     Homocysteine    Birth control counseling recommend hold birth control pill for now while working up for clot etiology    Lumbar radiculopathy May need to do x-ray next  -     Ambulatory Referral to Physical Therapy Evaluate and treat    Class 2 obesity without serious comorbidity with body mass index (BMI) of 38.0 to 38.9 in adult, unspecified obesity type encourage patient to lose weight    Palpitations  -     Adult Transthoracic Echo Complete W/ Cont if Necessary Per Protocol    Annual physical exam  -     CBC & Differential  -     Comprehensive Metabolic Panel  -     Lipid Panel  -     TSH  -     Hepatitis C Antibody      1 mo after blood tests

## 2022-10-02 DIAGNOSIS — I82.602 ARM VEIN BLOOD CLOT, LEFT: Primary | ICD-10-CM

## 2022-10-03 ENCOUNTER — IMMUNIZATION (OUTPATIENT)
Dept: VACCINE CLINIC | Facility: HOSPITAL | Age: 27
End: 2022-10-03

## 2022-10-03 ENCOUNTER — APPOINTMENT (OUTPATIENT)
Dept: ULTRASOUND IMAGING | Facility: HOSPITAL | Age: 27
End: 2022-10-03

## 2022-10-03 DIAGNOSIS — Z23 NEED FOR VACCINATION: Primary | ICD-10-CM

## 2022-10-03 PROCEDURE — 91312 HC SARSCOV2 VAC 30MCG/0.3ML IM BIVALENT BOOSTER 12 YRS AND OLDER: CPT | Performed by: HOSPITALIST

## 2022-10-03 PROCEDURE — 0124A: CPT | Performed by: HOSPITALIST

## 2022-10-05 ENCOUNTER — LAB (OUTPATIENT)
Dept: LAB | Facility: HOSPITAL | Age: 27
End: 2022-10-05

## 2022-10-05 DIAGNOSIS — I82.602 ACUTE EMBOLISM AND THROMBOSIS OF VEIN OF LEFT UPPER EXTREMITY: Primary | ICD-10-CM

## 2022-10-05 LAB
ALBUMIN SERPL-MCNC: 4.1 G/DL (ref 3.5–5.2)
ALBUMIN/GLOB SERPL: 1.6 G/DL
ALP SERPL-CCNC: 89 U/L (ref 39–117)
ALT SERPL W P-5'-P-CCNC: 14 U/L (ref 1–33)
ANION GAP SERPL CALCULATED.3IONS-SCNC: 13.3 MMOL/L (ref 5–15)
AST SERPL-CCNC: 18 U/L (ref 1–32)
BASOPHILS # BLD AUTO: 0.05 10*3/MM3 (ref 0–0.2)
BASOPHILS NFR BLD AUTO: 0.8 % (ref 0–1.5)
BILIRUB SERPL-MCNC: 0.4 MG/DL (ref 0–1.2)
BUN SERPL-MCNC: 8 MG/DL (ref 6–20)
BUN/CREAT SERPL: 11.8 (ref 7–25)
CALCIUM SPEC-SCNC: 8.9 MG/DL (ref 8.6–10.5)
CHLORIDE SERPL-SCNC: 102 MMOL/L (ref 98–107)
CHOLEST SERPL-MCNC: 205 MG/DL (ref 0–200)
CO2 SERPL-SCNC: 21.7 MMOL/L (ref 22–29)
CREAT SERPL-MCNC: 0.68 MG/DL (ref 0.57–1)
DEPRECATED RDW RBC AUTO: 39.9 FL (ref 37–54)
EGFRCR SERPLBLD CKD-EPI 2021: 124.1 ML/MIN/1.73
EOSINOPHIL # BLD AUTO: 0.32 10*3/MM3 (ref 0–0.4)
EOSINOPHIL NFR BLD AUTO: 5.4 % (ref 0.3–6.2)
ERYTHROCYTE [DISTWIDTH] IN BLOOD BY AUTOMATED COUNT: 12.3 % (ref 12.3–15.4)
GLOBULIN UR ELPH-MCNC: 2.6 GM/DL
GLUCOSE SERPL-MCNC: 99 MG/DL (ref 65–99)
HCT VFR BLD AUTO: 41 % (ref 34–46.6)
HCV AB SER DONR QL: NORMAL
HCYS SERPL-MCNC: 4.9 UMOL/L (ref 0–15)
HDLC SERPL-MCNC: 74 MG/DL (ref 40–60)
HGB BLD-MCNC: 13.4 G/DL (ref 12–15.9)
IMM GRANULOCYTES # BLD AUTO: 0.02 10*3/MM3 (ref 0–0.05)
IMM GRANULOCYTES NFR BLD AUTO: 0.3 % (ref 0–0.5)
INR PPP: 0.88 (ref 0.84–1.13)
LDLC SERPL CALC-MCNC: 113 MG/DL (ref 0–100)
LDLC/HDLC SERPL: 1.5 {RATIO}
LYMPHOCYTES # BLD AUTO: 1.62 10*3/MM3 (ref 0.7–3.1)
LYMPHOCYTES NFR BLD AUTO: 27.5 % (ref 19.6–45.3)
MCH RBC QN AUTO: 28.6 PG (ref 26.6–33)
MCHC RBC AUTO-ENTMCNC: 32.7 G/DL (ref 31.5–35.7)
MCV RBC AUTO: 87.6 FL (ref 79–97)
MONOCYTES # BLD AUTO: 0.57 10*3/MM3 (ref 0.1–0.9)
MONOCYTES NFR BLD AUTO: 9.7 % (ref 5–12)
NEUTROPHILS NFR BLD AUTO: 3.32 10*3/MM3 (ref 1.7–7)
NEUTROPHILS NFR BLD AUTO: 56.3 % (ref 42.7–76)
NRBC BLD AUTO-RTO: 0 /100 WBC (ref 0–0.2)
PLATELET # BLD AUTO: 255 10*3/MM3 (ref 140–450)
PMV BLD AUTO: 10.8 FL (ref 6–12)
POTASSIUM SERPL-SCNC: 4.1 MMOL/L (ref 3.5–5.2)
PROT SERPL-MCNC: 6.7 G/DL (ref 6–8.5)
PROTHROMBIN TIME: 11.9 SECONDS (ref 11.4–14.4)
RBC # BLD AUTO: 4.68 10*6/MM3 (ref 3.77–5.28)
SODIUM SERPL-SCNC: 137 MMOL/L (ref 136–145)
TRIGL SERPL-MCNC: 101 MG/DL (ref 0–150)
TSH SERPL DL<=0.05 MIU/L-ACNC: 2.07 UIU/ML (ref 0.27–4.2)
VLDLC SERPL-MCNC: 18 MG/DL (ref 5–40)
WBC NRBC COR # BLD: 5.9 10*3/MM3 (ref 3.4–10.8)

## 2022-10-05 PROCEDURE — 86803 HEPATITIS C AB TEST: CPT

## 2022-10-05 PROCEDURE — 85610 PROTHROMBIN TIME: CPT

## 2022-10-05 PROCEDURE — 83090 ASSAY OF HOMOCYSTEINE: CPT

## 2022-10-05 PROCEDURE — 85302 CLOT INHIBIT PROT C ANTIGEN: CPT

## 2022-10-05 PROCEDURE — 80061 LIPID PANEL: CPT

## 2022-10-05 PROCEDURE — 81241 F5 GENE: CPT

## 2022-10-05 PROCEDURE — 85305 CLOT INHIBIT PROT S TOTAL: CPT

## 2022-10-05 PROCEDURE — 80050 GENERAL HEALTH PANEL: CPT

## 2022-10-05 PROCEDURE — 85306 CLOT INHIBIT PROT S FREE: CPT

## 2022-10-06 ENCOUNTER — APPOINTMENT (OUTPATIENT)
Dept: ULTRASOUND IMAGING | Facility: HOSPITAL | Age: 27
End: 2022-10-06

## 2022-10-07 LAB — F5 GENE MUT ANL BLD/T: ABNORMAL

## 2022-10-14 LAB
PROT C AG ACT/NOR PPP IA: 127 % (ref 60–150)
PROT S AG ACT/NOR PPP IA: 72 % (ref 60–150)
PROT S FREE AG ACT/NOR PPP IA: 90 % (ref 61–136)

## 2022-10-18 ENCOUNTER — LAB (OUTPATIENT)
Dept: LAB | Facility: HOSPITAL | Age: 27
End: 2022-10-18

## 2022-10-18 DIAGNOSIS — I82.602 ACUTE EMBOLISM AND THROMBOSIS OF VEIN OF LEFT UPPER EXTREMITY: Primary | ICD-10-CM

## 2022-10-18 LAB — APTT PPP: 27.8 SECONDS (ref 22–39)

## 2022-10-18 PROCEDURE — 85730 THROMBOPLASTIN TIME PARTIAL: CPT

## 2022-10-18 PROCEDURE — 85301 ANTITHROMBIN III ANTIGEN: CPT

## 2022-10-18 PROCEDURE — 85300 ANTITHROMBIN III ACTIVITY: CPT

## 2022-10-18 PROCEDURE — 36415 COLL VENOUS BLD VENIPUNCTURE: CPT

## 2022-10-19 LAB
AT III ACT/NOR PPP CHRO: 132 % (ref 75–135)
AT III AG ACT/NOR PPP IA: 89 % (ref 72–124)

## 2022-10-20 ENCOUNTER — HOSPITAL ENCOUNTER (OUTPATIENT)
Dept: ULTRASOUND IMAGING | Facility: HOSPITAL | Age: 27
Discharge: HOME | End: 2022-10-20
Admitting: INTERNAL MEDICINE

## 2022-10-20 DIAGNOSIS — I82.602 ARM VEIN BLOOD CLOT, LEFT: ICD-10-CM

## 2022-10-20 PROCEDURE — 93971 EXTREMITY STUDY: CPT

## 2022-10-21 ENCOUNTER — HOSPITAL ENCOUNTER (OUTPATIENT)
Dept: CARDIOLOGY | Facility: HOSPITAL | Age: 27
Discharge: HOME | End: 2022-10-21
Admitting: INTERNAL MEDICINE

## 2022-10-21 LAB
BH CV ECHO MEAS - AO MAX PG: 5.9 MMHG
BH CV ECHO MEAS - AO MEAN PG: 4.1 MMHG
BH CV ECHO MEAS - AO ROOT DIAM: 2.9 CM
BH CV ECHO MEAS - AO V2 MAX: 121 CM/SEC
BH CV ECHO MEAS - AO V2 VTI: 23.3 CM
BH CV ECHO MEAS - AVA(I,D): 3.2 CM2
BH CV ECHO MEAS - EDV(CUBED): 37.9 ML
BH CV ECHO MEAS - EDV(MOD-SP2): 90.6 ML
BH CV ECHO MEAS - EDV(MOD-SP4): 111 ML
BH CV ECHO MEAS - EF(MOD-BP): 59.4 %
BH CV ECHO MEAS - EF(MOD-SP2): 56.5 %
BH CV ECHO MEAS - EF(MOD-SP4): 63.5 %
BH CV ECHO MEAS - ESV(CUBED): 10.3 ML
BH CV ECHO MEAS - ESV(MOD-SP2): 39.4 ML
BH CV ECHO MEAS - ESV(MOD-SP4): 40.5 ML
BH CV ECHO MEAS - FS: 35.2 %
BH CV ECHO MEAS - IVS/LVPW: 0.96 CM
BH CV ECHO MEAS - IVSD: 0.91 CM
BH CV ECHO MEAS - LA DIMENSION: 3.1 CM
BH CV ECHO MEAS - LAT PEAK E' VEL: 16.2 CM/SEC
BH CV ECHO MEAS - LV MASS(C)D: 87.2 GRAMS
BH CV ECHO MEAS - LV MAX PG: 4.7 MMHG
BH CV ECHO MEAS - LV MEAN PG: 2.6 MMHG
BH CV ECHO MEAS - LV V1 MAX: 108.9 CM/SEC
BH CV ECHO MEAS - LV V1 VTI: 22.7 CM
BH CV ECHO MEAS - LVIDD: 3.4 CM
BH CV ECHO MEAS - LVIDS: 2.18 CM
BH CV ECHO MEAS - LVOT AREA: 3.2 CM2
BH CV ECHO MEAS - LVOT DIAM: 2.03 CM
BH CV ECHO MEAS - LVPWD: 0.95 CM
BH CV ECHO MEAS - MED PEAK E' VEL: 6.5 CM/SEC
BH CV ECHO MEAS - MV A MAX VEL: 89.5 CM/SEC
BH CV ECHO MEAS - MV DEC TIME: 0.2 MSEC
BH CV ECHO MEAS - MV E MAX VEL: 76.3 CM/SEC
BH CV ECHO MEAS - MV E/A: 0.85
BH CV ECHO MEAS - PA ACC TIME: 0.15 SEC
BH CV ECHO MEAS - PA PR(ACCEL): 12.5 MMHG
BH CV ECHO MEAS - PA V2 MAX: 95 CM/SEC
BH CV ECHO MEAS - SV(LVOT): 73.4 ML
BH CV ECHO MEAS - SV(MOD-SP2): 51.2 ML
BH CV ECHO MEAS - SV(MOD-SP4): 70.5 ML
BH CV ECHO MEAS - TAPSE (>1.6): 1.75 CM
BH CV ECHO MEAS - TR MAX PG: 6.5 MMHG
BH CV ECHO MEAS - TR MAX VEL: 127 CM/SEC
BH CV ECHO MEASUREMENTS AVERAGE E/E' RATIO: 6.72
BH CV VAS BP LEFT ARM: NORMAL MMHG
BH CV XLRA - RV BASE: 3.2 CM
BH CV XLRA - RV LENGTH: 6.4 CM
BH CV XLRA - RV MID: 2.18 CM
BH CV XLRA - TDI S': 12.1 CM/SEC
LEFT ATRIUM VOLUME INDEX: 11.1 ML/M2
LV EF 2D ECHO EST: 59 %
MAXIMAL PREDICTED HEART RATE: 195 BPM
STRESS TARGET HR: 166 BPM

## 2022-10-21 PROCEDURE — 93306 TTE W/DOPPLER COMPLETE: CPT

## 2022-10-21 PROCEDURE — 25010000002 SULFUR HEXAFLUORIDE MICROSPH 60.7-25 MG RECONSTITUTED SUSPENSION: Performed by: INTERNAL MEDICINE

## 2022-10-21 PROCEDURE — 93306 TTE W/DOPPLER COMPLETE: CPT | Performed by: INTERNAL MEDICINE

## 2022-10-21 RX ADMIN — SULFUR HEXAFLUORIDE 5 ML: KIT at 14:11

## 2022-10-27 ENCOUNTER — TREATMENT (OUTPATIENT)
Dept: PHYSICAL THERAPY | Facility: CLINIC | Age: 27
End: 2022-10-27

## 2022-10-27 DIAGNOSIS — S39.012S STRAIN OF LUMBAR REGION, SEQUELA: Primary | ICD-10-CM

## 2022-10-27 PROCEDURE — 97530 THERAPEUTIC ACTIVITIES: CPT | Performed by: PHYSICAL THERAPIST

## 2022-10-27 PROCEDURE — 97140 MANUAL THERAPY 1/> REGIONS: CPT | Performed by: PHYSICAL THERAPIST

## 2022-10-27 PROCEDURE — 97110 THERAPEUTIC EXERCISES: CPT | Performed by: PHYSICAL THERAPIST

## 2022-10-27 PROCEDURE — 97161 PT EVAL LOW COMPLEX 20 MIN: CPT | Performed by: PHYSICAL THERAPIST

## 2022-10-27 NOTE — PROGRESS NOTES
Physical Therapy Initial Evaluation and Plan of Care      Patient: Ledy Barrera   : 1995  Diagnosis/ICD-10 Code:  Strain of lumbar region, sequela [S39.012S]  Referring practitioner: Estvean Phillips MD    Subjective Evaluation    History of Present Illness  Mechanism of injury: Patient reports that she hurt her back about a month ago. She states that she always has some degree of back pain (for the last 10 years) but her pain is worse now. She states that she has pain in both sides of her low back. She also has pain down the front of her R leg. She states that her leg burns a lot but this pain will come and go. She has noticed the burning in the leg happens most often when she is lying flat of her back at night but it can happen when she is walking too. 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 She states that she continues to work and do all her job duties.       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  Current pain ratin  At best pain ratin  At worst pain ratin  Quality: burning, dull ache and sharp  Relieving factors: rest, heat, ice and medications (Tylenol and advil )  Aggravating factors: ambulation, lifting, stairs, repetitive movement, standing, movement, prolonged positioning and squatting  Progression: improved    Social Support  Lives in: multiple-level home  Lives with: alone    Diagnostic Tests  X-ray: normal  CT scan: normal    Treatments  No previous or current treatments  Patient Goals  Patient goals for therapy: decreased pain, increased motion, increased strength, independence with ADLs/IADLs and return to sport/leisure activities             Objective        Special Questions      Additional Special Questions  Patient denies red flags.      Postural Observations  Seated posture: fair  Standing posture: good    Additional Postural Observation Details  Slightly forward flexed  posture.     Palpation   Left   Tenderness of the erector spinae and lumbar paraspinals.     Right Tenderness of the erector spinae and lumbar paraspinals.     Tenderness     Lumbar Spine  Tenderness in the spinous process (L1-L5).     Neurological Testing     Sensation     Lumbar   Left   Intact: light touch    Right   Intact: light touch    Reflexes   Left   Patellar (L4): normal (2+)  Achilles (S1): normal (2+)    Right   Patellar (L4): normal (2+)  Achilles (S1): normal (2+)    Active Range of Motion     Lumbar   Flexion: 42 degrees   Extension: 8 degrees     Strength/Myotome Testing     Left Hip   Planes of Motion   Flexion: 4-  Abduction: 3+  Adduction: 4-    Right Hip   Planes of Motion   Flexion: 4-  Abduction: 3+  Adduction: 4-    Left Knee   Flexion: 4+  Extension: 4+    Right Knee   Flexion: 4+  Extension: 4+    Left Ankle/Foot   Dorsiflexion: 4+  Plantar flexion: 4+    Right Ankle/Foot   Dorsiflexion: 4+  Plantar flexion: 4+    Tests       Thoracic   Negative slump.     Lumbar     Left   Negative crossed SLR, femoral stretch and passive SLR.     Right   Negative crossed SLR, femoral stretch and passive SLR.      General Comments     Lumbar Comments  Patient demonstrates significant guarding of low back.    She ambulates with guarded gait pattern.     She demonstrates difficulty relaxing with manual therapy.     HEP established. She demonstrates mild difficulty maintaining pelvic tilt. She demonstrates weakness in core musculature.     Diagnosis/prognosis reviewed.              Assessment & Plan     Assessment  Impairments: abnormal or restricted ROM, activity intolerance, impaired physical strength, lacks appropriate home exercise program, pain with function and weight-bearing intolerance  Functional Limitations: carrying objects, lifting, walking, pulling, pushing, uncomfortable because of pain, sitting, standing, stooping and unable to perform repetitive tasks  Assessment details: Patient is a 27 year  old female who comes to physical therapy with complaints of low back pain. Signs and symptoms are consistent with strain of lumbar region but she does have hx of chronic low back pain. No neuro signs/symptoms noted during exam. R LE pain could be due to inflammation in low back. The patient currently has pain, decreased ROM, decreased strength, and inability to perform many essential functional activities. Pt given HEP to assist with noted deficits. Pt will benefit from skilled PT services to address the above issues.       Goals  Plan Goals: SHORT TERM GOALS:     2 weeks  1. Pt independent with HEP  2. Pt to demonstrate trunk AROM 50-75% of expected norms to allow for improved ability to perform ADL's  3. Pt to demonstrate bilateral hip strength 4/5 in all planes to improved stability of the core/trunk     LONG TERM GOALS:   6 weeks  1. Pt to demonstrate trunk AROM % of expected norms to allow for improved ability to perform functional activities  2. Pt to demonstrate ability to perform full functional squat with good form and without increased pain in the low back   3. Pt to report being able to work full shift or work in the home without increase in pain in the back          Plan  Therapy options: will be seen for skilled therapy services  Planned modality interventions: cryotherapy, dry needling, electrical stimulation/Kenyan stimulation, ultrasound and thermotherapy (hydrocollator packs)  Planned therapy interventions: abdominal trunk stabilization, body mechanics training, fine motor coordination training, flexibility, functional ROM exercises, home exercise program, joint mobilization, therapeutic activities, stretching, strengthening, spinal/joint mobilization, soft tissue mobilization, manual therapy and postural training  Frequency: 1x week  Duration in weeks: 6  Treatment plan discussed with: patient  Plan details: Patient is only available on Thursdays.         Manual Therapy:    12     mins   17945;  Therapeutic Exercise:    10     mins  43064;     Neuromuscular Evette:        mins  57660;    Therapeutic Activity:     16     mins  11093;     Gait Training:           mins  14422;     Ultrasound:          mins  70177;    Electrical Stimulation:         mins  46159 ( );  Dry Needling          mins self-pay    Timed Treatment:   38   mins   Total Treatment:     55   mins    PT SIGNATURE: WALLY Alfaro License: [id number]  DATE TREATMENT INITIATED: 10/27/2022    Initial Certification  Certification Period: 1/24/2023  I certify that the therapy services are furnished while this patient is under my care.  The services outlined above are required by this patient, and will be reviewed every 90 days.     PHYSICIAN: Estevan Phillips MD      DATE:     Please sign and return via fax to [phone].. Thank you, [facility].

## 2022-11-03 ENCOUNTER — TREATMENT (OUTPATIENT)
Dept: PHYSICAL THERAPY | Facility: CLINIC | Age: 27
End: 2022-11-03

## 2022-11-03 DIAGNOSIS — S39.012S STRAIN OF LUMBAR REGION, SEQUELA: Primary | ICD-10-CM

## 2022-11-03 PROCEDURE — 97112 NEUROMUSCULAR REEDUCATION: CPT | Performed by: PHYSICAL THERAPIST

## 2022-11-03 PROCEDURE — 97140 MANUAL THERAPY 1/> REGIONS: CPT | Performed by: PHYSICAL THERAPIST

## 2022-11-03 PROCEDURE — 97110 THERAPEUTIC EXERCISES: CPT | Performed by: PHYSICAL THERAPIST

## 2022-11-03 NOTE — PROGRESS NOTES
Physical Therapy Daily Note    Patient Information  Ledy Barrera  1995      Visit # : 2    Ledy Barrera reports 1/10 pain today at rest.  Patient reports that her back is hurting a little less today. She has been off work all week due to her mother passing away. She states that she has had to sit in some really hard chairs for her visitation and  and this really hurt her back. She states that not working this week has seemed to help her back. She states that she has done her exercises everyday but one and they are going okay at home.         Objective Pt presents to PT today with no distress noted.     Significant tenderness noted in B lumbar paraspinals.     Patient with improved symptoms following STM to lumbar paraspinals.     Very mild discomfort is reported with lumbar PA.     Slight discomfort is noted with pelvic tilts and hamstring curls. Discomfort resolved at completion of these exercises.     No skin irritation noted following moist heat.       See Exercise, Manual, and Modality Logs for complete treatment.     Assessment/Plan  Patient tolerated session well. She had slight discomfort in low back with pelvic tilts and hamstring curls. This discomfort did not last following the exercise. She continues to have tenderness in low back. She tolerated manual therapy well. She had less tenderness to palpation following manual therapy. She was encouraged to continue HEP and utilize ice/heat as needed at home      Progress per Plan of Care  PT will continue to monitor patients signs and symptoms and progress patient as tolerated.     Visit Diagnoses:    ICD-10-CM ICD-9-CM   1. Strain of lumbar region, sequela  S39.012S 905.7            Manual Therapy:    18     mins  53766;  Therapeutic Exercise:    14     mins  47402;     Neuromuscular Evetet:  12      mins  05194;    Therapeutic Activity:          mins  52595;     Gait Training:           mins  84207;     Ultrasound:          mins  34883;     Electrical Stimulation:         mins  67942 ( );  Dry Needling          mins self-pay    Timed Treatment:   44   mins   Total Treatment:     58   mins          Candi Jacob PT  Physical Therapist  [place] License: [id number]

## 2022-11-10 ENCOUNTER — TREATMENT (OUTPATIENT)
Dept: PHYSICAL THERAPY | Facility: CLINIC | Age: 27
End: 2022-11-10

## 2022-11-10 ENCOUNTER — OFFICE VISIT (OUTPATIENT)
Dept: INTERNAL MEDICINE | Facility: CLINIC | Age: 27
End: 2022-11-10

## 2022-11-10 ENCOUNTER — CONSULT (OUTPATIENT)
Dept: ONCOLOGY | Facility: CLINIC | Age: 27
End: 2022-11-10

## 2022-11-10 VITALS
OXYGEN SATURATION: 98 % | HEART RATE: 95 BPM | DIASTOLIC BLOOD PRESSURE: 69 MMHG | RESPIRATION RATE: 12 BRPM | TEMPERATURE: 98.4 F | WEIGHT: 214 LBS | HEIGHT: 63 IN | BODY MASS INDEX: 37.92 KG/M2 | SYSTOLIC BLOOD PRESSURE: 132 MMHG

## 2022-11-10 VITALS
DIASTOLIC BLOOD PRESSURE: 78 MMHG | TEMPERATURE: 96.9 F | BODY MASS INDEX: 37.92 KG/M2 | HEIGHT: 63 IN | OXYGEN SATURATION: 98 % | SYSTOLIC BLOOD PRESSURE: 120 MMHG | HEART RATE: 108 BPM | WEIGHT: 214 LBS

## 2022-11-10 DIAGNOSIS — D68.51 HETEROZYGOUS FACTOR V LEIDEN MUTATION: Primary | ICD-10-CM

## 2022-11-10 DIAGNOSIS — Z71.85 VACCINE COUNSELING: ICD-10-CM

## 2022-11-10 DIAGNOSIS — M54.16 LUMBAR RADICULOPATHY: ICD-10-CM

## 2022-11-10 DIAGNOSIS — I82.602 ARM VEIN BLOOD CLOT, LEFT: ICD-10-CM

## 2022-11-10 DIAGNOSIS — S39.012S STRAIN OF LUMBAR REGION, SEQUELA: Primary | ICD-10-CM

## 2022-11-10 DIAGNOSIS — E66.9 CLASS 2 OBESITY WITHOUT SERIOUS COMORBIDITY WITH BODY MASS INDEX (BMI) OF 38.0 TO 38.9 IN ADULT, UNSPECIFIED OBESITY TYPE: ICD-10-CM

## 2022-11-10 DIAGNOSIS — R00.2 PALPITATIONS: Primary | ICD-10-CM

## 2022-11-10 PROBLEM — R55 SYNCOPE AND COLLAPSE: Status: RESOLVED | Noted: 2020-10-13 | Resolved: 2022-11-10

## 2022-11-10 PROCEDURE — 97140 MANUAL THERAPY 1/> REGIONS: CPT | Performed by: PHYSICAL THERAPIST

## 2022-11-10 PROCEDURE — 99204 OFFICE O/P NEW MOD 45 MIN: CPT | Performed by: INTERNAL MEDICINE

## 2022-11-10 PROCEDURE — 99214 OFFICE O/P EST MOD 30 MIN: CPT | Performed by: INTERNAL MEDICINE

## 2022-11-10 PROCEDURE — 90471 IMMUNIZATION ADMIN: CPT | Performed by: INTERNAL MEDICINE

## 2022-11-10 PROCEDURE — 90651 9VHPV VACCINE 2/3 DOSE IM: CPT | Performed by: INTERNAL MEDICINE

## 2022-11-10 PROCEDURE — 97112 NEUROMUSCULAR REEDUCATION: CPT | Performed by: PHYSICAL THERAPIST

## 2022-11-10 PROCEDURE — 97110 THERAPEUTIC EXERCISES: CPT | Performed by: PHYSICAL THERAPIST

## 2022-11-10 NOTE — PROGRESS NOTES
New Patient Office Visit      Date: 11/10/2022     Patient Name: Ledy Barrera  MRN: [id number]  : 1995  Referring Physician: Estevan Phillips    Chief Complaint: Establish care for heterozygous factor V Leiden mutation    History of Present Illness: Ledy Barrera is a pleasant 27 y.o. female with no significant past medical history who presents today for evaluation of there is heterozygous factor V Leiden mutation. The patient recently developed a superficial thrombus in her upper extremity after venipuncture.  She was initially placed on Eliquis for month before stopping the medication.  Repeat duplex 2 months after her initial diagnosis showed clot resolution.  Her PCP completed a partial hypercoagulable work-up which was notable for heterozygous factor V Leiden mutation.  Patient was also on estrogen-containing birth control at the time of her superficial thrombus.  She has since stopped her birth control and is currently only on a baby aspirin.  She does not know much about her family history report which she does there is no history of blood clots.  She has never had a DVT or PE personally    Oncology History:    Oncology/Hematology History    No history exists.       Subjective      Review of Systems:     Constitutional: Negative for fevers, chills, or weight loss  Eyes: Negative for blurred vision or discharge         Ear/Nose/Throat: Negative for difficulty swallowing, sore throat, LAD                                                       Respiratory: Negative for cough, SOA, wheezing                                                                                        Cardiovascular: Negative for chest pain or palpitations                                                                  Gastrointestinal: Negative for nausea, vomiting or diarrhea                                                                     Genitourinary: Negative for dysuria or hematuria                                                                                            Musculoskeletal: Negative for any joint pains or muscle aches                                                                        Neurologic: Negative for any weakness, headaches, dizziness                                                                         Hematologic: Negative for any easy bleeding or bruising                                                                                   Psychiatric: Negative for anxiety or depression                             Past Medical History:   Past Medical History:   Diagnosis Date   • Deep vein thrombosis (HCC) 8/7/2022    Superficial L basilic   • Low back pain        Past Surgical History:   Past Surgical History:   Procedure Laterality Date   • EYE SURGERY  2018    Lasik right & left, PRK left       Family History:   Family History   Problem Relation Age of Onset   • Cervical cancer Mother    • Diabetes Father         Type 2   • Hypertension Father    • Other Father         NON ALCOHOL FATTY LIVER   • Hyperlipidemia Father    • Other Brother         SEIZURES   • No Known Problems Maternal Grandmother    • Stroke Maternal Grandfather    • Cirrhosis Paternal Grandmother    • Cancer Paternal Grandmother         Breast   • Cancer Paternal Grandfather         Lung   • Other Maternal Aunt         Lupus       Social History:   Social History     Socioeconomic History   • Marital status: Single   Tobacco Use   • Smoking status: Never   • Smokeless tobacco: Never   Vaping Use   • Vaping Use: Never used   Substance and Sexual Activity   • Alcohol use: Yes     Alcohol/week: 1.0 standard drink     Types: 1 Glasses of wine per week   • Drug use: Never   • Sexual activity: Yes     Partners: Male     Birth control/protection: Condom, Birth control pill       Medications:     Current Outpatient Medications:   •  aspirin 81 MG oral suspension, , Disp: , Rfl:     Allergies:   Allergies   Allergen Reactions   • Benzoyl  "Peroxide Other (See Comments)     Swelling of face and mouth       Objective     Physical Exam:  Vital Signs:   Vitals:    11/10/22 1450   BP: 132/69   Pulse: 95   Resp: 12   Temp: 98.4 °F (36.9 °C)   TempSrc: Temporal   SpO2: 98%   Weight: 97.1 kg (214 lb)   Height: 160 cm (63\")   PainSc: 0-No pain     Pain Score    11/10/22 1450   PainSc: 0-No pain     ECOG Performance Status: 0 - Asymptomatic    Constitutional: NAD, ECOG 0  Eyes: PERRLA, scleral anicteric  ENT: No LAD, no thyromegaly  Respiratory: CTAB, no wheezing, rales, rhonchi  Cardiovascular: RRR, no murmurs, pulses 2+ bilaterally  Abdomen: soft, NT/ND, no HSM  Musculoskeletal: strength 5/5 bilaterally, no c/c/e  Neurologic: A&O x 3, CN II-XII intact grossly  Psych: mood and affect congruent, no SI or HI    Results Review:   No visits with results within 2 Week(s) from this visit.   Latest known visit with results is:   Lab on 10/18/2022   Component Date Value Ref Range Status   • AntiThromb III Func 10/18/2022 132  75 - 135 % Final    Direct Xa inhibitor anticoagulants such as rivaroxaban, apixaban and  edoxaban will lead to spuriously elevated antithrombin activity  levels possibly masking a deficiency.   • Antithrombin Antigen 10/18/2022 89  72 - 124 % Final    This test was developed and its performance characteristics  determined by [facility]. It has not been cleared or approved  by the Food and Drug Administration.   • PTT 10/18/2022 27.8  22.0 - 39.0 seconds Final       Adult Transthoracic Echo Complete W/ Cont if Necessary Per Protocol    Result Date: 10/21/2022  Narrative: •  Left ventricular ejection fraction appears to be 56 - 60%. Left ventricular systolic function is normal. •  The cardiac valves are structurally and functionally within normal limits.     US Venous Doppler Upper Extremity Left (duplex)    Result Date: 10/20/2022  Narrative: PROCEDURE: US VENOUS DOPPLER UPPER EXTREMITY LEFT (DUPLEX)-  HISTORY: left arm clot; I82.602-Acute embolism " and thrombosis of unspecified veins of left upper extremity  PROCEDURE: Graded compression, spectral analysis and ultrasound images of the venous system of the left upper extremity were obtained.  FINDINGS: The jugular vein, subclavian vein, axillary vein, brachial vein, cephalic vein and basilic venous system are normal, fully compressible and  demonstrate no evidence of thrombosis.      Impression: No evidence of upper extremity venous thrombosis.    This report was signed and finalized on 10/20/2022 9:59 AM by Yfn Delarosa MD.      Assessment / Plan      Assessment/Plan:   1. Heterozygous factor V Leiden mutation (HCC) (Primary)  2.  History of left upper extremity superficial thrombus  -Superficial thrombus provoked in nature secondary to venipuncture and being on estrogen-containing birth control  -No personal history of DVT or PE  -Partial hypercoagulable work-up notable for heterozygous factor V Leiden mutation  -Agree with avoiding estrogen-containing birth control Subsys.  Recommend IUD vs potentially a progesterone only birth control  -As she has no personal history of DVT or PE, she does not require any prophylactic antiplatelet or anticoagulation  -Counseled patient on signs and symptoms of DVT/PE including but not limited to chest pain, shortness of breath, upper/lower extremity pain/swelling/erythema  -Counseled patient to contact the clinic should she have any issues with blood clots in her pregnancy or miscarriages  -No further hematologic work-up required at this time.  She can follow-up in the clinic as needed    Follow Up:   Follow-up as needed     David Milian MD  Hematology and Oncology     Please note that portions of this note may have been completed with a voice recognition program. Efforts were made to edit the dictations, but occasionally words are mistranscribed.

## 2022-11-10 NOTE — PROGRESS NOTES
Subjective   Ledy Barrera is a 27 y.o. female.     Chief Complaint   Patient presents with   • Follow-up     Recent lab results   • Coagulation Disorder       History of Present Illness   Patient here for follow-up.  Left upper extremity blood clot resolved.  Work-up patient has positive factor V Leyden heterogeneous.  Patient is going to discuss more with hematologist to see the need for long-term anticoagulant.  Patient is not using birth control pill anymore.  Patient still has a low back pain in is receiving physical therapy and.  Weight is still high BMI 37.  Echocardiogram unremarkable patient still has some heart beating fast    Current Outpatient Medications:   •  aspirin 81 MG oral suspension, , Disp: , Rfl:     The following portions of the patient's history were reviewed and updated as appropriate: allergies, current medications, past family history, past medical history, past social history, past surgical history and problem list.    Review of Systems   Constitutional: Negative.    Respiratory: Negative.    Cardiovascular: Negative.    Gastrointestinal: Negative.    Musculoskeletal: Positive for back pain.   Skin: Negative.    Neurological: Negative.    Psychiatric/Behavioral: Negative.        Objective   Physical Exam  Cardiovascular:      Rate and Rhythm: Normal rate and regular rhythm.      Heart sounds: Normal heart sounds.   Pulmonary:      Effort: Pulmonary effort is normal.      Breath sounds: Normal breath sounds.   Abdominal:      General: Bowel sounds are normal.   Musculoskeletal:         General: Tenderness present.      Cervical back: Neck supple.   Skin:     General: Skin is warm.   Neurological:      Mental Status: She is alert and oriented to person, place, and time.         All tests have been reviewed.    Assessment & Plan   There are no diagnoses linked to this encounter.          LUE  clot, left reviewed labs +factor V leiden   -     Ambulatory Referral to Hematology to see if  patient still in need of eliquis   -     Duplex Venous Upper Extremity - normal      Birth control counseling recommend hold birth control pill for now while working up for clot etiology     Lumbar radiculopathy in the midst of PT cont       Class 2 obesity without serious comorbidity with body mass index (BMI) of 38.0 to 38.9 in adult, unspecified obesity type encourage patient to lose weight     Palpitations echo NSD    Gyn for pap     tdap uptodate per patient     HPV  Today   Answers for HPI/ROS submitted by the patient on 11/9/2022  Please describe your symptoms.: Follow up for lumbar pain & LUE thrombus  Have you had these symptoms before?: Yes  How long have you been having these symptoms?: Greater than 2 weeks  Please list any medications you are currently taking for this condition.: Aspirin 81mg  What is the primary reason for your visit?: Other

## 2022-11-10 NOTE — PROGRESS NOTES
Physical Therapy Daily Note    Patient Information  Ledy Barrera  1995      Visit # : 3    Ledy Barrera reports 2/10 pain today at rest. Patient reports that her back was feeling good then she went back to work. She states that her low back hurts all the time. It is worse when she is at work. She states that doing the exercises at home are going okay.           Objective Pt presents to PT today with no distress noted.     Moderated tenderness is noted in B lumbar paraspinals.     No irritation of symptoms with exercise    No difficulty managing positional changes.     No gait deficits noted.    See Exercise, Manual, and Modality Logs for complete treatment.     Assessment/Plan  Patient tolerated session well with no increased pain with exercises. Tenderness was noted in lumbar region. She tolerated manual therapy fair. She demonstrates slight irritation with STM to lumbar paraspinals but this resolved at completion of manual therapy. She was encouraged to continue HEP and utilize ice/heat as needed at home.       Progress per Plan of Care  PT will continue to monitor patients signs and symptoms and progress patient as tolerated.     Visit Diagnoses:    ICD-10-CM ICD-9-CM   1. Strain of lumbar region, sequela  S39.012S 905.7            Manual Therapy:    16     mins  09217;  Therapeutic Exercise:    15     mins  07781;     Neuromuscular Evette:    11    mins  06048;    Therapeutic Activity:          mins  41624;     Gait Training:           mins  93565;     Ultrasound:          mins  20816;    Electrical Stimulation:         mins  47331 ( );  Dry Needling          mins self-pay    Timed Treatment:   42   mins   Total Treatment:     50   mins          Candi Jacob, PT  Physical Therapist  [place] License: [id number]

## 2022-11-17 ENCOUNTER — TELEPHONE (OUTPATIENT)
Dept: PHYSICAL THERAPY | Facility: CLINIC | Age: 27
End: 2022-11-17

## 2022-12-08 ENCOUNTER — TREATMENT (OUTPATIENT)
Dept: PHYSICAL THERAPY | Facility: CLINIC | Age: 27
End: 2022-12-08

## 2022-12-08 DIAGNOSIS — S39.012S STRAIN OF LUMBAR REGION, SEQUELA: Primary | ICD-10-CM

## 2022-12-08 PROCEDURE — 97112 NEUROMUSCULAR REEDUCATION: CPT | Performed by: PHYSICAL THERAPIST

## 2022-12-08 PROCEDURE — 97140 MANUAL THERAPY 1/> REGIONS: CPT | Performed by: PHYSICAL THERAPIST

## 2022-12-08 PROCEDURE — 97110 THERAPEUTIC EXERCISES: CPT | Performed by: PHYSICAL THERAPIST

## 2022-12-08 NOTE — PROGRESS NOTES
Physical Therapy Daily Note    Patient Information  Ledy Barrera  1995      Visit # : 4    Ledy Barrera reports 2/10 pain today at rest.  Patient reports that her back may be doing a little better. She does have pain that comes and goes but she gets relief at times. She states that she has been working on her exercises everyday.         Objective Pt presents to PT today with no distress noted.     No tenderness was noted in low back.     Patient with no gait deficits noted.     No issues with positional changes.     No irritation of symptoms with exercises today.       See Exercise, Manual, and Modality Logs for complete treatment.     Assessment/Plan  Patient tolerated session well with no increased pain with exercises. No tenderness was noted in low back. She tolerated manual therapy well and had no reports of discomfort. She was encouraged to continue HEP and utilize ice/heat as needed at home. Patient is starting new job out of town. Patient wants to manage symptoms at home but will return to PT if symptoms worsen.        Progress per Plan of Care  Holding PT at this time.     Visit Diagnoses:    ICD-10-CM ICD-9-CM   1. Strain of lumbar region, sequela  S39.012S 905.7            Manual Therapy:    15     mins  03290;  Therapeutic Exercise:    15     mins  17713;     Neuromuscular Evette:    10    mins  62402;    Therapeutic Activity:          mins  78746;     Gait Training:           mins  16548;     Ultrasound:          mins  18198;    Electrical Stimulation:         mins  57389 ( );  Dry Needling          mins self-pay    Timed Treatment:   40   mins   Total Treatment:     48   mins          Candi Jacob PT  Physical Therapist  [place] License: [id number]

## 2022-12-28 ENCOUNTER — CLINICAL SUPPORT (OUTPATIENT)
Dept: INTERNAL MEDICINE | Facility: CLINIC | Age: 27
End: 2022-12-28

## 2022-12-28 DIAGNOSIS — Z71.85 VACCINE COUNSELING: ICD-10-CM

## 2022-12-28 PROCEDURE — 90651 9VHPV VACCINE 2/3 DOSE IM: CPT | Performed by: INTERNAL MEDICINE

## 2022-12-28 PROCEDURE — 90471 IMMUNIZATION ADMIN: CPT | Performed by: INTERNAL MEDICINE

## 2023-02-23 ENCOUNTER — OFFICE VISIT (OUTPATIENT)
Dept: OBSTETRICS AND GYNECOLOGY | Facility: CLINIC | Age: 28
End: 2023-02-23
Payer: COMMERCIAL

## 2023-02-23 VITALS
WEIGHT: 211 LBS | BODY MASS INDEX: 37.39 KG/M2 | SYSTOLIC BLOOD PRESSURE: 130 MMHG | HEIGHT: 63 IN | DIASTOLIC BLOOD PRESSURE: 88 MMHG

## 2023-02-23 DIAGNOSIS — Z86.718 HISTORY OF DVT (DEEP VEIN THROMBOSIS): ICD-10-CM

## 2023-02-23 DIAGNOSIS — N63.12 MASS OF UPPER INNER QUADRANT OF RIGHT BREAST: ICD-10-CM

## 2023-02-23 DIAGNOSIS — Z01.419 WELL WOMAN EXAM WITH ROUTINE GYNECOLOGICAL EXAM: Primary | ICD-10-CM

## 2023-02-23 DIAGNOSIS — Z30.8 ENCOUNTER FOR OTHER CONTRACEPTIVE MANAGEMENT: ICD-10-CM

## 2023-02-23 PROCEDURE — 99385 PREV VISIT NEW AGE 18-39: CPT | Performed by: NURSE PRACTITIONER

## 2023-02-23 RX ORDER — DROSPIRENONE AND ETHINYL ESTRADIOL 0.02-3(28)
1 KIT ORAL DAILY
COMMUNITY
End: 2023-02-23

## 2023-02-23 NOTE — PROGRESS NOTES
Subjective   Chief Complaint   Patient presents with   • Gynecologic Exam     Well woman exam     Ledy Barrera is a 27 y.o. year old  presenting to be seen for her annual exam.   She has never had a Pap in the past.  She is currently sexually active with 1 partner, suspect her only sexual partner.  She is not interested in pregnancy now or in the future and is considering sterilization.  She has been on combined hormonal birth control pills since age 16 for heavy painful periods.  She has a diagnosis of factor V Leiden mutation.  She has history of a DVT in her left arm in September of last year.  Despite this she has continued to take Johanna continuously.  She was evaluated by hematology last year however has not had further follow-up.  She is not interested in an IUD.  She wants a hysterectomy.  Her mother had uterine cancer diagnosed at age 46 stage III.  Her paternal grandmother had breast cancer in her 50s.  SEXUAL Hx:  She is currently sexually active.  In the past year there there has been NO new sexual partners.    Condoms are never used.  She would like to be screened for STD's at today's exam.  Current birth control method: OCP (estrogen/progesterone).  She is not happy with her current method of contraception and does want to discuss alternative methods of contraception.  MENSTRUAL Hx:  Patient's last menstrual period was 2023 (approximate).  In the past 6 months her cycles have been regular, predictable and occur monthly.  Her menstrual Bleeding is heavy when not taking OCPs..   Each month on average there are roughly 0 day(s) of very heavy flow.    Intermenstrual bleeding is absent.    Post-coital bleeding is absent.  Dysmenorrhea: moderate and when not taking ocps  PMS: mild and is not affecting her activities of daily living  Her cycles ARE a source of concern for her that she wishes to discuss today.  HEALTH Hx:  She exercises regularly: no (but is planning to start exercising  "more).  She wears her seat belt: yes.  She has concerns about domestic violence: no.  OTHER THINGS SHE WANTS TO DISCUSS TODAY:  Nothing else    The following portions of the patient's history were reviewed and updated as appropriate:problem list, current medications, allergies, past family history, past medical history, past social history and past surgical history.    Social History    Tobacco Use      Smoking status: Never      Smokeless tobacco: Never    Review of Systems  Constitutional POS: nothing reported    NEG: anorexia or night sweats   Genitourinary POS: nothing reported    NEG: dysuria or hematuria      Gastointestinal POS: nothing reported    NEG: bloating, change in bowel habits, melena or reflux symptoms   Integument POS: nothing reported    NEG: moles that are changing in size, shape, color or rashes   Breast POS: nothing reported    NEG: persistent breast lump, skin dimpling or nipple discharge        Objective   /88 (BP Location: Left arm, Patient Position: Sitting, Cuff Size: Adult)   Ht 160 cm (63\")   Wt 95.7 kg (211 lb)   LMP 01/01/2023 (Approximate)   BMI 37.38 kg/m²     General:  well developed; well nourished  no acute distress   Skin:  No suspicious lesions seen   Thyroid: normal to inspection and palpation   Breasts:  Examined in supine position  Nipples normal without inversion, lesions or discharge  There are no palpable axillary nodes  Right breast with 2 tender palpable masses measuring approximately 2 cm each.  At 3:00 3 cm from areola.  Patient had not noticed these areas until exam but however had marked tenderness with exam.   Abdomen: soft, non-tender; no masses  no umbilical or inguinal hernias are present  no hepato-splenomegaly   Pelvis: Clinical staff was present for exam  :  urethral meatus normal;  Vaginal:  normal pink mucosa without prolapse or lesions.  Cervix:  normal appearance.  Uterus:  normal size, shape and consistency.  Adnexa:  normal bimanual exam of " the adnexa.  Rectal:  digital rectal exam not performed; anus visually normal appearing.  Exam limited by patient body habitus        Assessment   1. Well woman gynecological exam  2. Factor V Leiden deficiency  3. History of DVT left upper extremity  4. Currently on combined birth control prescribed by previous provider  5. Dysmenorrhea and menorrhagia when not on birth control  6. Desires sterilization  7. Right breast mass upper inner quadrant     Plan     1. Pap was done today.  If she does not receive the results of the Pap within 2 weeks  time, she was instructed to call to find out the results.  I explained to Ledy that the recommendations for Pap smear interval in a low risk patient's has lengthened to 3 years time.  I encouraged her to be seen yearly for a full physical exam including breast and pelvic exam even during the off years when PAP's will not be performed.  2. Reviewed contraception methods which are progesterone only and safe with clotting disorder.  Instructed patient to stop her Johanna.  Sample for Slynd given in office today.  Discussed Mirena IUD at length.  Discussed with patient would not recommend hysterectomy for bleeding control at this time without trying progesterone only methods.  She is not interested in IUD.  She is open to trying the Slynd for now but wants to schedule appointment to talk to an MD about possible hysterectomy.  Also discussed with patient possible tubal with ablation.  Discussed with patient ablations can only be performed 1 time and at her age would not cover her periods until menopause.  3. Start slynd.  A new prescription(s) was created today prescription sent to [facility] pharmacy and will  4. The importance of keeping all planned follow-up and taking all medications as prescribed was emphasized.  5. Today I discussed with Ledy the total recommended calcium intake for a premenopausal female is 1000 mg.  Ideally this should be from dietary sources.  I  reviewed calcium content in various foods including milk, fortified orange juice and yogurt.  If she cannot get sufficient calcium through dietary means, it is recommended to supplement with either a multivitamin or calcium to reach her daily goal.  I also reviewed the difference in the bioavailability of calcium carbonate and calcium citrate containing supplements and the importance of taking calcium carbonate containing products with food.  Finally, vitamin D's role in calcium absorption was reviewed and a total daily vitamin D intake of 800 units was recommended.  6. Follow up for annual exam 1 year and at her convenience to discuss dysmenorrhea, menorrhagia options further with MD.    No orders of the defined types were placed in this encounter.         This note was electronically signed.    Tricia Rain, LAUREN  February 23, 2023

## 2023-02-24 LAB — REF LAB TEST METHOD: NORMAL

## 2023-03-29 ENCOUNTER — OFFICE VISIT (OUTPATIENT)
Dept: OBSTETRICS AND GYNECOLOGY | Facility: CLINIC | Age: 28
End: 2023-03-29
Payer: COMMERCIAL

## 2023-03-29 VITALS
BODY MASS INDEX: 38.15 KG/M2 | WEIGHT: 215.3 LBS | RESPIRATION RATE: 14 BRPM | SYSTOLIC BLOOD PRESSURE: 120 MMHG | HEIGHT: 63 IN | DIASTOLIC BLOOD PRESSURE: 78 MMHG

## 2023-03-29 DIAGNOSIS — Z30.8 ENCOUNTER FOR TUBAL LIGATION COUNSELING: Primary | ICD-10-CM

## 2023-03-29 DIAGNOSIS — D68.51 HETEROZYGOUS FACTOR V LEIDEN MUTATION: ICD-10-CM

## 2023-03-29 NOTE — PROGRESS NOTES
"Subjective   Chief Complaint   Patient presents with   • Follow-up     Surgery consult for tubal          Ledy SOMMERS Bruce is a 27 y.o. year old .  Patient's last menstrual period was 2023 (exact date).  She presents for tubal ligation consult Alcira.  Of note, patient has a history of a superficial venous thrombosis in the left arm and is factor V Leiden heterozygous. She is currently on Slynd for contraception and bleeding control. She has never wanted children and now due to her clotting disorder, she is concerned for her health even more, and wants to ensure she never becomes pregnant.     Of note, patient is a travel nurse!     PMH: Factor V Leiden heterozygous, acute left upper extremity DVT (resolved)   PSH: non contributory     The following portions of the patient's history were reviewed and updated as appropriate:current medications, allergies, past medical history and past surgical history    Social History    Tobacco Use      Smoking status: Never      Smokeless tobacco: Never         Objective   /78 (BP Location: Right arm, Patient Position: Sitting, Cuff Size: Adult)   Resp 14   Ht 160 cm (63\")   Wt 97.7 kg (215 lb 4.8 oz)   LMP 2023 (Exact Date)   Breastfeeding No   BMI 38.14 kg/m²     Lab Review   No data reviewed    Imaging   No data reviewed        Assessment   1. Tubal ligation counseling   2. Factor V Leiden heterozygous with history of VTE, not currently on anticoagulation      Plan   1. Extensively discussed permanent nature of tubal ligation, and patient voices understanding and is in agreement with plan of care. Continue Slynd in the interim until her procedure for bleeding control and contraception.   2. Will need PCP clearance prior to procedure.   3. See below for surgical counseling.   4. Up to date on pap smear   The methods of female sterilization were discussed.  I discussed laparoscopic total salpingectomy with Ledy.  I explained that the historical " failure rate for tubal ligation has been quoted as ~ 1/300.  Data from the CREST study suggests that for certain techniques, the failure rates could be as high as ~ 4%.  Salpingectomy may reduce the failure rates historically seen with segmental salpingectomy.   Furthermore, total salpingectomy may be associated with a reduction in the lifetime incidence of ovarian cancer.  As compared to partial salpingectomy, total salpingectomy is a permanent method of female sterilization that cannot be reversed.  I explained to Ledy that with failures, there is an increased risk of ectopic pregnancy.  Ruptured ectopic gestations can be life threatening if not treated.  Procedural risks include but not limited to the risk of bleeding, infection, and damage to internal organs were discussed.   Additionally I discussed with Ledy regret rate and post-tubal ligation syndrome.  I discussed with Ledy that for all practical purposes the procedure should be considered permanent and non-reversable.  If there is not complete certainty regarding sterilization, long acting reversible contraception (LARC's) should be considered.  Other forms of LARC's that were reviewed include Depo-Provera, IUD - Mirena, Nexplanon and OCP (progestin only).  Additionally, non-contraceptive benefits of these methods should be considered in making her final decision.  5. The importance of keeping all planned follow-up and taking all medications as prescribed was emphasized.  6. Follow up scheduled procedure or sooner PRN     No orders of the defined types were placed in this encounter.         This note was electronically signed.    Mary Burns MD   March 29, 2023

## 2023-04-05 ENCOUNTER — OFFICE VISIT (OUTPATIENT)
Dept: INTERNAL MEDICINE | Facility: CLINIC | Age: 28
End: 2023-04-05
Payer: COMMERCIAL

## 2023-04-05 ENCOUNTER — HOSPITAL ENCOUNTER (OUTPATIENT)
Dept: GENERAL RADIOLOGY | Facility: HOSPITAL | Age: 28
Discharge: HOME | End: 2023-04-05
Admitting: INTERNAL MEDICINE
Payer: COMMERCIAL

## 2023-04-05 VITALS
OXYGEN SATURATION: 100 % | DIASTOLIC BLOOD PRESSURE: 76 MMHG | TEMPERATURE: 97.7 F | BODY MASS INDEX: 38.09 KG/M2 | HEIGHT: 63 IN | RESPIRATION RATE: 16 BRPM | WEIGHT: 215 LBS | SYSTOLIC BLOOD PRESSURE: 115 MMHG | HEART RATE: 94 BPM

## 2023-04-05 DIAGNOSIS — Z30.09 BIRTH CONTROL COUNSELING: ICD-10-CM

## 2023-04-05 DIAGNOSIS — D68.51 HETEROZYGOUS FACTOR V LEIDEN MUTATION: ICD-10-CM

## 2023-04-05 DIAGNOSIS — Z01.818 PREOP EXAMINATION: ICD-10-CM

## 2023-04-05 DIAGNOSIS — E66.09 CLASS 2 OBESITY DUE TO EXCESS CALORIES WITHOUT SERIOUS COMORBIDITY WITH BODY MASS INDEX (BMI) OF 38.0 TO 38.9 IN ADULT: Primary | ICD-10-CM

## 2023-04-05 PROBLEM — M54.16 LUMBAR RADICULOPATHY: Status: RESOLVED | Noted: 2022-09-29 | Resolved: 2023-04-05

## 2023-04-05 PROBLEM — R00.2 PALPITATIONS: Status: RESOLVED | Noted: 2020-10-13 | Resolved: 2023-04-05

## 2023-04-05 LAB
ALBUMIN SERPL-MCNC: 4.2 G/DL (ref 3.5–5.2)
ALBUMIN/GLOB SERPL: 2 G/DL
ALP SERPL-CCNC: 87 U/L (ref 39–117)
ALT SERPL-CCNC: 10 U/L (ref 1–33)
APPEARANCE UR: CLEAR
APTT PPP: 28.3 SECONDS (ref 23.5–35.5)
AST SERPL-CCNC: 8 U/L (ref 1–32)
BASOPHILS # BLD AUTO: 0.05 10*3/MM3 (ref 0–0.2)
BASOPHILS NFR BLD AUTO: 0.8 % (ref 0–1.5)
BILIRUB SERPL-MCNC: 0.4 MG/DL (ref 0–1.2)
BILIRUB UR QL STRIP: NEGATIVE
BUN SERPL-MCNC: 10 MG/DL (ref 6–20)
BUN/CREAT SERPL: 13.9 (ref 7–25)
CALCIUM SERPL-MCNC: 9.1 MG/DL (ref 8.6–10.5)
CHLORIDE SERPL-SCNC: 104 MMOL/L (ref 98–107)
CO2 SERPL-SCNC: 22.8 MMOL/L (ref 22–29)
COLOR UR: YELLOW
CREAT SERPL-MCNC: 0.72 MG/DL (ref 0.57–1)
EGFRCR SERPLBLD CKD-EPI 2021: 119.2 ML/MIN/1.73
EOSINOPHIL # BLD AUTO: 0.13 10*3/MM3 (ref 0–0.4)
EOSINOPHIL NFR BLD AUTO: 2.1 % (ref 0.3–6.2)
ERYTHROCYTE [DISTWIDTH] IN BLOOD BY AUTOMATED COUNT: 12.4 % (ref 12.3–15.4)
GLOBULIN SER CALC-MCNC: 2.1 GM/DL
GLUCOSE SERPL-MCNC: 92 MG/DL (ref 65–99)
GLUCOSE UR QL STRIP: NEGATIVE
HCT VFR BLD AUTO: 40.8 % (ref 34–46.6)
HGB BLD-MCNC: 12.6 G/DL (ref 12–15.9)
HGB UR QL STRIP: NEGATIVE
IMM GRANULOCYTES # BLD AUTO: 0.01 10*3/MM3 (ref 0–0.05)
IMM GRANULOCYTES NFR BLD AUTO: 0.2 % (ref 0–0.5)
INR PPP: 0.9 (ref 0.9–1.1)
KETONES UR QL STRIP: NEGATIVE
LEUKOCYTE ESTERASE UR QL STRIP: NEGATIVE
LYMPHOCYTES # BLD AUTO: 1.81 10*3/MM3 (ref 0.7–3.1)
LYMPHOCYTES NFR BLD AUTO: 29.2 % (ref 19.6–45.3)
MCH RBC QN AUTO: 27.5 PG (ref 26.6–33)
MCHC RBC AUTO-ENTMCNC: 30.9 G/DL (ref 31.5–35.7)
MCV RBC AUTO: 88.9 FL (ref 79–97)
MONOCYTES # BLD AUTO: 0.45 10*3/MM3 (ref 0.1–0.9)
MONOCYTES NFR BLD AUTO: 7.3 % (ref 5–12)
NEUTROPHILS # BLD AUTO: 3.74 10*3/MM3 (ref 1.7–7)
NEUTROPHILS NFR BLD AUTO: 60.4 % (ref 42.7–76)
NITRITE UR QL STRIP: NEGATIVE
NRBC BLD AUTO-RTO: 0 /100 WBC (ref 0–0.2)
PH UR STRIP: 7.5 [PH] (ref 5–8)
PLATELET # BLD AUTO: 297 10*3/MM3 (ref 140–450)
POTASSIUM SERPL-SCNC: 4.4 MMOL/L (ref 3.5–5.2)
PROT SERPL-MCNC: 6.3 G/DL (ref 6–8.5)
PROT UR QL STRIP: NEGATIVE
PROTHROMBIN TIME: 12.4 SECONDS (ref 12.5–14.5)
RBC # BLD AUTO: 4.59 10*6/MM3 (ref 3.77–5.28)
SODIUM SERPL-SCNC: 138 MMOL/L (ref 136–145)
SP GR UR STRIP: 1.01 (ref 1–1.03)
UROBILINOGEN UR STRIP-MCNC: NORMAL MG/DL
WBC # BLD AUTO: 6.19 10*3/MM3 (ref 3.4–10.8)

## 2023-04-05 PROCEDURE — 71046 X-RAY EXAM CHEST 2 VIEWS: CPT

## 2023-04-05 NOTE — PROGRESS NOTES
Subjective   Ledy Barrera is a 27 y.o. female who presents to the office today for a preoperative consultation at the request of surgeon who plans on performing tubal ligation  on not yet schedule 1. This consultation is requested for the specific conditions prompting preoperative evaluation (i.e. because of potential affect on operative risk). Planned anesthesia: general. The patient has the following known anesthesia issues: NA. Patients bleeding risk: no recent abnormal bleeding. Patient does not have objections to receiving blood products if needed.    The following portions of the patient's history were reviewed and updated as appropriate: allergies, current medications, past family history, past medical history, past social history, past surgical history and problem list.    Review of Systems  Review of Systems   Constitutional: Negative.    Respiratory: Negative.    Cardiovascular: Negative.    Gastrointestinal: Negative.    Musculoskeletal: Negative.    Skin: Negative.    Neurological: Negative.    Psychiatric/Behavioral: Negative.        Objective   Physical Exam  Cardiovascular:      Rate and Rhythm: Normal rate and regular rhythm.      Heart sounds: Normal heart sounds.   Pulmonary:      Effort: Pulmonary effort is normal.      Breath sounds: Normal breath sounds.   Abdominal:      General: Bowel sounds are normal.   Musculoskeletal:      Cervical back: Neck supple.   Skin:     General: Skin is warm.   Neurological:      Mental Status: She is alert and oriented to person, place, and time.   Psychiatric:         Behavior: Behavior normal.         All  tests have been reviewed.    Cardiographics  ECG: not yet done  Echocardiogram: not done    Imaging  Chest x-ray: normal     Lab Review   not applicable    Assessment & Plan   27 y.o. female with planned surgery as above.    Known risk factors for perioperative complications: None  factor leiden mutation        Cardiac Risk Estimation: NA    Current  medications which may produce withdrawal symptoms if withheld perioperatively: NA    1. Preoperative workup as follows ECG, hemoglobin, hematocrit, electrolytes, creatinine, glucose, liver function studies, coagulation studies, urinalysis (urinary tract instrumentation planned).  2. Change in medication regimen before surgery: none, continue medication regimen including morning of surgery, with sip of water.  3. Prophylaxis for cardiac events with perioperative beta-blockers: not indicated.  4. Deep vein thrombosis prophylaxis postoperatively:NA.    EKG normal and CXR       ECG 12 Lead    Date/Time: 4/5/2023 9:37 AM  Performed by: Estevan Phillips MD  Authorized by: Estevan Phillips MD   Comparison: not compared with previous ECG   Previous ECG: no previous ECG available  Rhythm: sinus rhythm  Rate: normal  Conduction: conduction normal  ST Segments: ST segments normal  QRS axis: normal  Other: no other findings    Clinical impression: normal ECG                   Answers for HPI/ROS submitted by the patient on 3/30/2023  Please describe your symptoms.: Clearance for surgery  Have you had these symptoms before?: No  How long have you been having these symptoms?: Greater than 2 weeks  What is the primary reason for your visit?: Other

## 2023-04-07 ENCOUNTER — TELEPHONE (OUTPATIENT)
Dept: OBSTETRICS AND GYNECOLOGY | Facility: CLINIC | Age: 28
End: 2023-04-07
Payer: COMMERCIAL

## 2023-04-07 NOTE — TELEPHONE ENCOUNTER
Pt called stating she seen her PCP yesterday for surgery clearance and he wants to know what all you are wanting her to be cleared for?

## 2023-04-10 ENCOUNTER — TELEPHONE (OUTPATIENT)
Dept: INTERNAL MEDICINE | Facility: CLINIC | Age: 28
End: 2023-04-10
Payer: COMMERCIAL

## 2023-04-10 NOTE — TELEPHONE ENCOUNTER
Caller: Ledy Barrera    Relationship: Self    Best call back number: [phone]    What is the best time to reach you: ANYTIME    Who are you requesting to speak with (clinical staff, provider,  specific staff member): HANY    Do you know the name of the person who called: PATIENT    What was the call regarding: PATIENT NEEDS SURGERY CLEARANCE PUT IN HER CHART FROM DR LEACH, STATING HE HAS REVIEWED HER LABS AND XR AND SHE IS OKAY FOR SURGERY    Do you require a callback: PLEASE ADVISE

## 2023-04-10 NOTE — TELEPHONE ENCOUNTER
Continue draft a letter stating patient is cleared for surgery and send to Dr. Burns.  Thank you !

## 2023-04-10 NOTE — TELEPHONE ENCOUNTER
Duplicate message. Gynecology has sent a message to PCP in regards to what is needed for pre-op clearance and provider can review and write appropriate letter.

## 2023-04-12 ENCOUNTER — HOSPITAL ENCOUNTER (OUTPATIENT)
Dept: ULTRASOUND IMAGING | Facility: HOSPITAL | Age: 28
Discharge: HOME | End: 2023-04-12
Admitting: NURSE PRACTITIONER
Payer: COMMERCIAL

## 2023-04-12 DIAGNOSIS — N63.12 MASS OF UPPER INNER QUADRANT OF RIGHT BREAST: ICD-10-CM

## 2023-04-12 PROCEDURE — 76642 ULTRASOUND BREAST LIMITED: CPT | Performed by: RADIOLOGY

## 2023-04-12 PROCEDURE — 76642 ULTRASOUND BREAST LIMITED: CPT

## 2023-06-13 ENCOUNTER — TELEPHONE (OUTPATIENT)
Dept: OBSTETRICS AND GYNECOLOGY | Facility: CLINIC | Age: 28
End: 2023-06-13

## 2023-06-13 NOTE — TELEPHONE ENCOUNTER
Caller: Ledy Barrera    Relationship: Self    Best call back number: [phone]    Do you know the name of the person who called:     What was the call regarding: PT RETURNING A MISSED CALL

## 2023-06-15 ENCOUNTER — PREP FOR SURGERY (OUTPATIENT)
Dept: OTHER | Facility: HOSPITAL | Age: 28
End: 2023-06-15
Payer: COMMERCIAL

## 2023-06-15 NOTE — H&P
Ledy Barrera  : 1995  MRN: [id number]  CSN: [id number]    History and Physical    Subjective   Ledy Barrera is a 27 y.o. year old  who present for tubal ligation.  The permanent nature of the procedure along with regret rate has previously been discussed.  Post-tubal ligation syndrome has been explained.    Past Medical History:   Diagnosis Date   • Bleeding disorder     Factor 5 Lieden   • Deep vein thrombosis 2022    Superficial L basilic   • Factor V Leiden    • Low back pain    • Urinary tract infection     Frequent   • Varicella     Childhood     Past Surgical History:   Procedure Laterality Date   • EYE SURGERY  2018    Lasik right & left, PRK left     OB History    Para Term  AB Living   0 0 0 0 0 0   SAB IAB Ectopic Molar Multiple Live Births   0 0 0 0 0 0     Social History    Tobacco Use      Smoking status: Never      Smokeless tobacco: Never      Current Outpatient Medications:   •  Drospirenone 4 MG tablet, Take 1 tablet by mouth Daily., Disp: 84 tablet, Rfl: 3    Allergies   Allergen Reactions   • Benzoyl Peroxide Other (See Comments)     Swelling of face and mouth       Review of Systems   All other systems reviewed and are negative.        Objective     Vital Signs: See nursing documentation   General: well developed; well nourished  no acute distress   Mental status: Alert and oriented   Heart: Not performed.   Lungs: breathing is unlabored   Abdomen: soft, non-tender; no masses  no umbilical or inguinal hernias are present   Pelvis: Not performed.        Assessment   1. Desires permanent sterilization  2. Factor V Leiden with history of VTE, not on anticoagulation      Plan   1. Laparoscopic sterilization via salpingectomy  2. S/p PCP clearance for surgery. Will need prophylactic Lovenox prior to procedure.        Mary Burns MD       (Pt's PCP is Estevan Phillips MD)

## 2023-06-16 ENCOUNTER — OUTSIDE FACILITY SERVICE (OUTPATIENT)
Dept: OBSTETRICS AND GYNECOLOGY | Facility: CLINIC | Age: 28
End: 2023-06-16
Payer: COMMERCIAL

## 2023-07-26 ENCOUNTER — OFFICE VISIT (OUTPATIENT)
Dept: INTERNAL MEDICINE | Facility: CLINIC | Age: 28
End: 2023-07-26
Payer: COMMERCIAL

## 2023-07-26 VITALS
BODY MASS INDEX: 37.74 KG/M2 | HEART RATE: 104 BPM | TEMPERATURE: 96.9 F | WEIGHT: 213 LBS | DIASTOLIC BLOOD PRESSURE: 70 MMHG | OXYGEN SATURATION: 99 % | HEIGHT: 63 IN | SYSTOLIC BLOOD PRESSURE: 120 MMHG

## 2023-07-26 DIAGNOSIS — E66.09 CLASS 2 OBESITY DUE TO EXCESS CALORIES WITHOUT SERIOUS COMORBIDITY WITH BODY MASS INDEX (BMI) OF 38.0 TO 38.9 IN ADULT: ICD-10-CM

## 2023-07-26 DIAGNOSIS — Z71.85 VACCINE COUNSELING: ICD-10-CM

## 2023-07-26 DIAGNOSIS — L65.9 HAIR LOSS: ICD-10-CM

## 2023-07-26 DIAGNOSIS — D68.51 HETEROZYGOUS FACTOR V LEIDEN MUTATION: ICD-10-CM

## 2023-07-26 DIAGNOSIS — R21 RASH OF FACE: Primary | ICD-10-CM

## 2023-07-26 NOTE — PROGRESS NOTES
Subjective   Ledy Barrera is a 27 y.o. female.     Chief Complaint   Patient presents with    Hair/Scalp Problem     Hair falling out. Started around 12/22, patient reports of having bald patches        History of Present Illness   Patient complains since December started to have hair loss.  Some stress in life and itchy over-the-counter shampoo for hair loss no help.  Weight is still elevated.  Patient also has rash in the facial area mildly itchy.    Current Outpatient Medications:     Drospirenone 4 MG tablet, Take 1 tablet by mouth Daily., Disp: 84 tablet, Rfl: 3    Current Facility-Administered Medications:     HPV 9-Valent Recomb Vaccine suspension 0.5 mL, 0.5 mL, Intramuscular, Once, Estevan Phillips MD    The following portions of the patient's history were reviewed and updated as appropriate: allergies, current medications, past family history, past medical history, past social history, past surgical history, and problem list.    Review of Systems   Constitutional: Negative.    Respiratory: Negative.     Cardiovascular: Negative.    Gastrointestinal: Negative.    Skin:  Positive for rash.        Hair loss    Psychiatric/Behavioral: Negative.       Objective   Physical Exam  Constitutional:       Appearance: She is well-developed.   Cardiovascular:      Rate and Rhythm: Normal rate and regular rhythm.      Heart sounds: Normal heart sounds.   Pulmonary:      Effort: Pulmonary effort is normal.      Breath sounds: Normal breath sounds.   Abdominal:      General: Bowel sounds are normal.      Palpations: Abdomen is soft.   Musculoskeletal:      Cervical back: Neck supple.   Skin:     Findings: Rash present.   Neurological:      Mental Status: She is alert and oriented to person, place, and time.   Psychiatric:         Behavior: Behavior normal.       All tests have been reviewed.    Assessment & Plan   Diagnoses and all orders for this visit:    Rash of face  -     Ambulatory Referral to  Dermatology    Hair loss  -     Ambulatory Referral to Dermatology    Heterozygous factor V Leiden mutation    Class 2 obesity due to excess calories without serious comorbidity with body mass index (BMI) of 38.0 to 38.9 in adult    Vaccine counseling  -     HPV 9-Valent Recomb Vaccine suspension 0.5 mL             Answers submitted by the patient for this visit:  Other (Submitted on 7/19/2023)  Please describe your symptoms.: Hair falling out  Have you had these symptoms before?: No  How long have you been having these symptoms?: Greater than 2 weeks  Primary Reason for Visit (Submitted on 7/19/2023)  What is the primary reason for your visit?: Other

## 2023-11-02 ENCOUNTER — OFFICE VISIT (OUTPATIENT)
Dept: INTERNAL MEDICINE | Facility: CLINIC | Age: 28
End: 2023-11-02
Payer: COMMERCIAL

## 2023-11-02 VITALS
OXYGEN SATURATION: 100 % | HEIGHT: 63 IN | DIASTOLIC BLOOD PRESSURE: 75 MMHG | HEART RATE: 94 BPM | WEIGHT: 219 LBS | SYSTOLIC BLOOD PRESSURE: 119 MMHG | BODY MASS INDEX: 38.8 KG/M2 | RESPIRATION RATE: 15 BRPM

## 2023-11-02 DIAGNOSIS — D68.51 HETEROZYGOUS FACTOR V LEIDEN MUTATION: Primary | ICD-10-CM

## 2023-11-02 DIAGNOSIS — E55.9 VITAMIN D DEFICIENCY: ICD-10-CM

## 2023-11-02 DIAGNOSIS — L21.9 SEBORRHEA: ICD-10-CM

## 2023-11-02 DIAGNOSIS — L65.9 ALOPECIA: ICD-10-CM

## 2023-11-02 RX ORDER — ERGOCALCIFEROL 1.25 MG/1
50000 CAPSULE ORAL WEEKLY
Qty: 5 CAPSULE | Refills: 5 | Status: SHIPPED | OUTPATIENT
Start: 2023-11-02

## 2023-11-02 RX ORDER — KETOCONAZOLE 20 MG/ML
1 SHAMPOO TOPICAL WEEKLY
COMMUNITY
Start: 2023-09-25

## 2023-11-02 RX ORDER — KETOCONAZOLE 20 MG/G
1 CREAM TOPICAL DAILY
COMMUNITY
Start: 2023-09-25

## 2023-11-02 RX ORDER — MULTIPLE VITAMINS W/ MINERALS TAB 9MG-400MCG
1 TAB ORAL DAILY
COMMUNITY

## 2023-11-02 NOTE — PROGRESS NOTES
Office Note     Name: Ledy Barrera    : 1995     MRN: [id number]     Chief Complaint  Establish Care (Offboarding Dr. Phillips. Vitamin D is low and testosterone is high/)    Subjective     History of Present Illness:  Ledy Barrera is a 28 y.o. female who presents today for high testosterone (50) and low vitamin d level after checking with dermatology office. She does note coarse dark hair on upper lip ad would need to wax it off, abnormal menstrual cycles prior to starting her birth control. She would have heavy menses lasting about 2 weeks with dysmenorrhea and then will not have any the next month. Hx of blood clots on L arm. She is now on progesterone pills.    Dermatology notes reviewed   Alopecia: She continues to see dermatology, on minoxidil, dutasteride and biotin.   Sebborhea: on ketoconazole shampoo    Past Medical History:   Past Medical History:   Diagnosis Date    Bleeding disorder     Factor 5 Lieden    Deep vein thrombosis 2022    Superficial L basilic    Factor V Leiden     Low back pain     Urinary tract infection     Frequent    Varicella     Childhood       Past Surgical History:   Past Surgical History:   Procedure Laterality Date    EYE SURGERY  2018    Lasik right & left, PRK left       Immunizations:   Immunization History   Administered Date(s) Administered    31-influenza Vac Quardvalent Preservativ 10/05/2017    COVID-19 (PFIZER) BIVALENT 12+YRS 10/03/2022    COVID-19 (PFIZER) Purple Cap Monovalent 2020, 2021    DTaP, Unspecified 1999    Fluzone (or Fluarix & Flulaval for VFC) >6mos 2023    Hib (PRP-OMP) 1999    Hpv9 11/10/2022, 2022, 2023    Influenza, Unspecified 10/05/2022    OPV 1999        Medications:     Current Outpatient Medications:     Drospirenone 4 MG tablet, Take 1 tablet by mouth Daily., Disp: 84 tablet, Rfl: 3    ketoconazole (NIZORAL) 2 % cream, Apply 1 application  topically to the appropriate area as  directed Daily., Disp: , Rfl:     ketoconazole (NIZORAL) 2 % shampoo, Apply 1 application  topically to the appropriate area as directed 1 (One) Time Per Week., Disp: , Rfl:     multivitamin with minerals tablet tablet, Take 1 tablet by mouth Daily., Disp: , Rfl:     vitamin D (ERGOCALCIFEROL) 1.25 MG (34631 UT) capsule capsule, Take 1 capsule by mouth 1 (One) Time Per Week., Disp: 5 capsule, Rfl: 5    Allergies:   Allergies   Allergen Reactions    Benzaclin [Clindamycin Phos-Benzoyl Perox] Other (See Comments)     Swelling of face and mouth       Family History:   Family History   Problem Relation Age of Onset    Cervical cancer Mother     Uterine cancer Mother     Cancer Mother         Uterine    Diabetes Father         Type 2    Hypertension Father     Other Father         NON ALCOHOL FATTY LIVER    Hyperlipidemia Father     Other Brother         SEIZURES    No Known Problems Maternal Grandmother     Breast cancer Paternal Grandmother 50    Cirrhosis Paternal Grandmother     Cancer Paternal Grandmother         Breast    Other Maternal Aunt         Lupus    Stroke Maternal Grandfather     Cancer Paternal Grandfather         Lung       Social History:   Social History     Socioeconomic History    Marital status: Significant Other    Number of children: 0    Highest education level: Bachelor's degree (e.g., BA, AB, BS)   Tobacco Use    Smoking status: Never    Smokeless tobacco: Never   Vaping Use    Vaping Use: Never used   Substance and Sexual Activity    Alcohol use: Not Currently     Comment: occas    Drug use: Never    Sexual activity: Yes     Partners: Male     Birth control/protection: Birth control pill       Health Maintenance   Topic Date Due    ANNUAL PHYSICAL  09/29/2023    BMI FOLLOWUP  09/29/2023    COVID-19 Vaccine (4 - 2023-24 season) 11/04/2023 (Originally 9/1/2023)    TDAP/TD VACCINES (1 - Tdap) 07/26/2024 (Originally 9/26/2016)    PAP SMEAR  02/23/2026    HEPATITIS C SCREENING  Completed     "INFLUENZA VACCINE  Completed    HPV VACCINES  Completed    Pneumococcal Vaccine 0-64  Aged Out    CHLAMYDIA SCREENING  Discontinued       Objective     Vital Signs  /75   Pulse 94   Resp 15   Ht 160 cm (62.99\")   Wt 99.3 kg (219 lb)   SpO2 100%   BMI 38.80 kg/m²   Estimated body mass index is 38.8 kg/m² as calculated from the following:    Height as of this encounter: 160 cm (62.99\").    Weight as of this encounter: 99.3 kg (219 lb).    Class 2 Severe Obesity (BMI >=35 and <=39.9). Obesity-related health conditions include the following: none. Obesity is unchanged. BMI is is above average; BMI management plan is completed. We discussed portion control and increasing exercise.      Physical Exam  Vitals and nursing note reviewed.   Constitutional:       Appearance: Normal appearance.   HENT:      Head: Normocephalic and atraumatic.   Cardiovascular:      Rate and Rhythm: Normal rate and regular rhythm.      Pulses: Normal pulses.      Heart sounds: Normal heart sounds.   Pulmonary:      Effort: Pulmonary effort is normal. No respiratory distress.      Breath sounds: Normal breath sounds. No wheezing, rhonchi or rales.   Abdominal:      General: Abdomen is flat. Bowel sounds are normal.      Palpations: Abdomen is soft.      Tenderness: There is no abdominal tenderness. There is no guarding.   Musculoskeletal:      Cervical back: Neck supple.   Skin:     General: Skin is warm.      Capillary Refill: Capillary refill takes less than 2 seconds.      Comments: Alopecia on scalp, no coarse hair on upper lip at this time   Neurological:      General: No focal deficit present.      Mental Status: She is alert. Mental status is at baseline.   Psychiatric:         Mood and Affect: Mood normal.         Behavior: Behavior normal.          Procedures     Assessment and Plan     Diagnoses and all orders for this visit:    1. Heterozygous factor V Leiden mutation (Primary)  Assessment & Plan:  Stable at this " time    Orders:  -     CBC & Differential  -     Comprehensive Metabolic Panel  -     Lipid Panel  -     TSH Rfx On Abnormal To Free T4  -     Vitamin D,25-Hydroxy  -     Vitamin B12  -     Ferritin  -     Iron Profile  -     Testosterone (Free & Total), LC / MS    2. Alopecia  Assessment & Plan:  Follow with dermatology  Reviewed dermatology medical records and labs   on minoxidil, dutasteride and biotin    Orders:  -     CBC & Differential  -     Comprehensive Metabolic Panel  -     Lipid Panel  -     TSH Rfx On Abnormal To Free T4  -     Vitamin D,25-Hydroxy  -     Vitamin B12  -     Ferritin  -     Iron Profile  -     Testosterone (Free & Total), LC / MS    3. Vitamin D deficiency  Assessment & Plan:  Will repeat  Start vitamin d 57335 weekly  Repeat in 6 months    Orders:  -     vitamin D (ERGOCALCIFEROL) 1.25 MG (87400 UT) capsule capsule; Take 1 capsule by mouth 1 (One) Time Per Week.  Dispense: 5 capsule; Refill: 5    4. Seborrhea  Assessment & Plan:  Follow with dermatology  Reviewed dermatology medical records and labs  On ketoconazole shampoo           Counseling was given to patient for the following topics: instructions for management, risks and benefits of treatment options, and importance of treatment compliance.    Follow Up  Return in about 3 months (around 2/2/2024) for Annual.    MD OBED Liu [facility]  [address] [address] [phone]

## 2023-11-02 NOTE — ASSESSMENT & PLAN NOTE
Follow with dermatology  Reviewed dermatology medical records and labs   on minoxidil, dutasteride and biotin

## 2023-11-05 LAB
25(OH)D3+25(OH)D2 SERPL-MCNC: 31.8 NG/ML (ref 30–100)
ALBUMIN SERPL-MCNC: 4.7 G/DL (ref 3.5–5.2)
ALBUMIN/GLOB SERPL: 2.5 G/DL
ALP SERPL-CCNC: 111 U/L (ref 39–117)
ALT SERPL-CCNC: 9 U/L (ref 1–33)
AST SERPL-CCNC: 12 U/L (ref 1–32)
BASOPHILS # BLD AUTO: 0.04 10*3/MM3 (ref 0–0.2)
BASOPHILS NFR BLD AUTO: 0.6 % (ref 0–1.5)
BILIRUB SERPL-MCNC: 0.7 MG/DL (ref 0–1.2)
BUN SERPL-MCNC: 11 MG/DL (ref 6–20)
BUN/CREAT SERPL: 14.3 (ref 7–25)
CALCIUM SERPL-MCNC: 9.5 MG/DL (ref 8.6–10.5)
CHLORIDE SERPL-SCNC: 109 MMOL/L (ref 98–107)
CHOLEST SERPL-MCNC: 146 MG/DL (ref 0–200)
CO2 SERPL-SCNC: 23.8 MMOL/L (ref 22–29)
CREAT SERPL-MCNC: 0.77 MG/DL (ref 0.57–1)
EGFRCR SERPLBLD CKD-EPI 2021: 109.3 ML/MIN/1.73
EOSINOPHIL # BLD AUTO: 0.1 10*3/MM3 (ref 0–0.4)
EOSINOPHIL NFR BLD AUTO: 1.4 % (ref 0.3–6.2)
ERYTHROCYTE [DISTWIDTH] IN BLOOD BY AUTOMATED COUNT: 12.3 % (ref 12.3–15.4)
FERRITIN SERPL-MCNC: 40.7 NG/ML (ref 13–150)
GLOBULIN SER CALC-MCNC: 1.9 GM/DL
GLUCOSE SERPL-MCNC: 101 MG/DL (ref 65–99)
HCT VFR BLD AUTO: 41.3 % (ref 34–46.6)
HDLC SERPL-MCNC: 49 MG/DL (ref 40–60)
HGB BLD-MCNC: 13.6 G/DL (ref 12–15.9)
IMM GRANULOCYTES # BLD AUTO: 0.01 10*3/MM3 (ref 0–0.05)
IMM GRANULOCYTES NFR BLD AUTO: 0.1 % (ref 0–0.5)
IRON SATN MFR SERPL: 24 % (ref 20–50)
IRON SERPL-MCNC: 94 MCG/DL (ref 37–145)
LDLC SERPL CALC-MCNC: 82 MG/DL (ref 0–100)
LYMPHOCYTES # BLD AUTO: 1.76 10*3/MM3 (ref 0.7–3.1)
LYMPHOCYTES NFR BLD AUTO: 24.8 % (ref 19.6–45.3)
MCH RBC QN AUTO: 28.5 PG (ref 26.6–33)
MCHC RBC AUTO-ENTMCNC: 32.9 G/DL (ref 31.5–35.7)
MCV RBC AUTO: 86.6 FL (ref 79–97)
MONOCYTES # BLD AUTO: 0.48 10*3/MM3 (ref 0.1–0.9)
MONOCYTES NFR BLD AUTO: 6.8 % (ref 5–12)
NEUTROPHILS # BLD AUTO: 4.7 10*3/MM3 (ref 1.7–7)
NEUTROPHILS NFR BLD AUTO: 66.3 % (ref 42.7–76)
NRBC BLD AUTO-RTO: 0 /100 WBC (ref 0–0.2)
PLATELET # BLD AUTO: 279 10*3/MM3 (ref 140–450)
POTASSIUM SERPL-SCNC: 4.6 MMOL/L (ref 3.5–5.2)
PROT SERPL-MCNC: 6.6 G/DL (ref 6–8.5)
RBC # BLD AUTO: 4.77 10*6/MM3 (ref 3.77–5.28)
SODIUM SERPL-SCNC: 142 MMOL/L (ref 136–145)
TESTOST FREE SERPL-MCNC: 1.8 PG/ML (ref 0–4.2)
TESTOST SERPL-MCNC: 27.9 NG/DL (ref 10–55)
TIBC SERPL-MCNC: 395 MCG/DL
TRIGL SERPL-MCNC: 78 MG/DL (ref 0–150)
TSH SERPL DL<=0.005 MIU/L-ACNC: 1.56 UIU/ML (ref 0.27–4.2)
UIBC SERPL-MCNC: 301 MCG/DL (ref 112–346)
VIT B12 SERPL-MCNC: 490 PG/ML (ref 211–946)
VLDLC SERPL CALC-MCNC: 15 MG/DL (ref 5–40)
WBC # BLD AUTO: 7.09 10*3/MM3 (ref 3.4–10.8)

## 2024-01-09 ENCOUNTER — OFFICE VISIT (OUTPATIENT)
Dept: INTERNAL MEDICINE | Facility: CLINIC | Age: 29
End: 2024-01-09
Payer: COMMERCIAL

## 2024-01-09 VITALS
HEIGHT: 63 IN | BODY MASS INDEX: 38.45 KG/M2 | WEIGHT: 217 LBS | OXYGEN SATURATION: 99 % | DIASTOLIC BLOOD PRESSURE: 80 MMHG | RESPIRATION RATE: 17 BRPM | HEART RATE: 89 BPM | SYSTOLIC BLOOD PRESSURE: 106 MMHG

## 2024-01-09 DIAGNOSIS — L65.9 ALOPECIA: ICD-10-CM

## 2024-01-09 DIAGNOSIS — R73.9 HYPERGLYCEMIA: ICD-10-CM

## 2024-01-09 DIAGNOSIS — Z00.00 ANNUAL PHYSICAL EXAM: Primary | ICD-10-CM

## 2024-01-09 DIAGNOSIS — D68.51 HETEROZYGOUS FACTOR V LEIDEN MUTATION: ICD-10-CM

## 2024-01-09 DIAGNOSIS — E55.9 VITAMIN D DEFICIENCY: ICD-10-CM

## 2024-01-09 PROBLEM — R21 RASH OF FACE: Status: RESOLVED | Noted: 2023-07-26 | Resolved: 2024-01-09

## 2024-01-09 PROBLEM — Z01.419 WELL WOMAN EXAM WITH ROUTINE GYNECOLOGICAL EXAM: Status: RESOLVED | Noted: 2023-02-23 | Resolved: 2024-01-09

## 2024-01-09 PROCEDURE — 99395 PREV VISIT EST AGE 18-39: CPT | Performed by: STUDENT IN AN ORGANIZED HEALTH CARE EDUCATION/TRAINING PROGRAM

## 2024-01-09 RX ORDER — CICLOPIROX 1 G/100ML
1 SHAMPOO TOPICAL 2 TIMES WEEKLY
COMMUNITY
Start: 2024-01-05

## 2024-01-09 RX ORDER — MINOXIDIL 2.5 MG/1
2.5 TABLET ORAL DAILY
COMMUNITY
Start: 2024-01-05

## 2024-01-09 NOTE — PROGRESS NOTES
Office Note     Name: Ledy Barrera    : 1995     MRN: [id number]     Chief Complaint  Annual Exam (physical)    Subjective     History of Present Illness:  Ledy Barrera is a 28 y.o. female who presents today for chronic conditions.    Factor V Leiden on drosperinone for birth control, follows gynecology  Vitamin D deficiency on vitamin D and multivitamins daily  Follow-up patient seborrhea on minoxidil follows with dermatology, not on biotin    Pap smear 2023 follows with gynecology    Family History:   Family History   Problem Relation Age of Onset    Cervical cancer Mother     Uterine cancer Mother     Cancer Mother         Uterine    Diabetes Father         Type 2    Hypertension Father     Other Father         NON ALCOHOL FATTY LIVER    Hyperlipidemia Father     Other Brother         SEIZURES    No Known Problems Maternal Grandmother     Breast cancer Paternal Grandmother 50    Cirrhosis Paternal Grandmother     Cancer Paternal Grandmother         Breast    Other Maternal Aunt         Lupus    Stroke Maternal Grandfather     Cancer Paternal Grandfather         Lung       Social History:   Social History     Socioeconomic History    Marital status: Single    Number of children: 0    Highest education level: Bachelor's degree (e.g., BA, AB, BS)   Tobacco Use    Smoking status: Never    Smokeless tobacco: Never   Vaping Use    Vaping Use: Never used   Substance and Sexual Activity    Alcohol use: Not Currently     Comment: occas    Drug use: Never    Sexual activity: Yes     Partners: Male     Birth control/protection: Bilateral salpingectomy        Health Maintenance   Topic Date Due    ANNUAL PHYSICAL  2023    TDAP/TD VACCINES (1 - Tdap) 2024 (Originally 2016)    COVID-19 Vaccine (2023- season) 2025 (Originally 2023)    BMI FOLLOWUP  2024    PAP SMEAR  2026    HEPATITIS C SCREENING  Completed    INFLUENZA VACCINE  Completed    HPV VACCINES   "Completed    Pneumococcal Vaccine 0-64  Aged Out    CHLAMYDIA SCREENING  Discontinued       Objective     Vital Signs  /80   Pulse 89   Resp 17   Ht 160 cm (62.99\")   Wt 98.4 kg (217 lb)   SpO2 99%   BMI 38.45 kg/m²   Estimated body mass index is 38.45 kg/m² as calculated from the following:    Height as of this encounter: 160 cm (62.99\").    Weight as of this encounter: 98.4 kg (217 lb).        Physical Exam  Vitals and nursing note reviewed.   Constitutional:       Appearance: Normal appearance.   HENT:      Head: Normocephalic and atraumatic.   Cardiovascular:      Rate and Rhythm: Normal rate and regular rhythm.      Pulses: Normal pulses.      Heart sounds: Normal heart sounds.   Pulmonary:      Effort: Pulmonary effort is normal. No respiratory distress.      Breath sounds: Normal breath sounds. No wheezing, rhonchi or rales.   Abdominal:      General: Abdomen is flat. Bowel sounds are normal.      Palpations: Abdomen is soft.      Tenderness: There is no abdominal tenderness. There is no guarding.   Musculoskeletal:      Cervical back: Neck supple.   Skin:     General: Skin is warm.      Capillary Refill: Capillary refill takes less than 2 seconds.   Neurological:      General: No focal deficit present.      Mental Status: She is alert. Mental status is at baseline.   Psychiatric:         Mood and Affect: Mood normal.         Behavior: Behavior normal.          Procedures     Assessment and Plan     Diagnoses and all orders for this visit:    1. Annual physical exam (Primary)  -     CBC & Differential  -     Comprehensive Metabolic Panel  -     Hemoglobin A1c  -     Lipid Panel  -     TSH Rfx On Abnormal To Free T4    2. Heterozygous factor V Leiden mutation  Assessment & Plan:  Stable, will continue to follow and monitor labs      3. Hyperglycemia  Assessment & Plan:  Will continue to monitor A1c not on medications diet controlled    Orders:  -     Hemoglobin A1c    4. Vitamin D " deficiency  Assessment & Plan:  On vitamin D weekly, will continue to monitor with labs    Orders:  -     Vitamin D,25-Hydroxy    5. Alopecia  Assessment & Plan:  Follows with dermatology notes androgenic alopecia on minoxidil           Counseling was given to patient for the following topics: instructions for management, risk factor reductions, and risks and benefits of treatment options.    Follow Up  Return in about 1 year (around 1/9/2025) for Annual.    MD OBED Liu [facility]  [address] [place] [id number] [phone]

## 2024-05-30 DIAGNOSIS — Z86.718 HISTORY OF DVT (DEEP VEIN THROMBOSIS): ICD-10-CM

## 2024-05-30 DIAGNOSIS — Z30.8 ENCOUNTER FOR OTHER CONTRACEPTIVE MANAGEMENT: ICD-10-CM

## 2024-05-30 RX ORDER — DROSPIRENONE 4 MG/1
1 TABLET, FILM COATED ORAL DAILY
Qty: 84 TABLET | Refills: 1 | Status: SHIPPED | OUTPATIENT
Start: 2024-05-30

## 2024-06-05 DIAGNOSIS — Z30.8 ENCOUNTER FOR OTHER CONTRACEPTIVE MANAGEMENT: ICD-10-CM

## 2024-06-05 DIAGNOSIS — Z86.718 HISTORY OF DVT (DEEP VEIN THROMBOSIS): ICD-10-CM

## 2024-06-05 RX ORDER — DROSPIRENONE 4 MG/1
1 TABLET, FILM COATED ORAL DAILY
Qty: 84 TABLET | Refills: 1 | Status: SHIPPED | OUTPATIENT
Start: 2024-06-05

## 2024-11-08 DIAGNOSIS — E55.9 VITAMIN D DEFICIENCY: ICD-10-CM

## 2024-11-08 RX ORDER — ERGOCALCIFEROL 1.25 MG/1
50000 CAPSULE, LIQUID FILLED ORAL WEEKLY
Qty: 5 CAPSULE | Refills: 5 | Status: SHIPPED | OUTPATIENT
Start: 2024-11-08

## 2025-05-20 DIAGNOSIS — E55.9 VITAMIN D DEFICIENCY: ICD-10-CM

## 2025-05-20 RX ORDER — ERGOCALCIFEROL 1.25 MG/1
50000 CAPSULE, LIQUID FILLED ORAL WEEKLY
Qty: 5 CAPSULE | Refills: 5 | Status: SHIPPED | OUTPATIENT
Start: 2025-05-20

## 2025-06-17 ENCOUNTER — OFFICE VISIT (OUTPATIENT)
Dept: INTERNAL MEDICINE | Facility: CLINIC | Age: 30
End: 2025-06-17
Payer: COMMERCIAL

## 2025-06-17 VITALS
HEART RATE: 101 BPM | TEMPERATURE: 98.1 F | HEIGHT: 63 IN | OXYGEN SATURATION: 100 % | WEIGHT: 220 LBS | DIASTOLIC BLOOD PRESSURE: 83 MMHG | BODY MASS INDEX: 38.98 KG/M2 | RESPIRATION RATE: 18 BRPM | SYSTOLIC BLOOD PRESSURE: 122 MMHG

## 2025-06-17 DIAGNOSIS — R73.9 HYPERGLYCEMIA: ICD-10-CM

## 2025-06-17 DIAGNOSIS — Z86.718 HISTORY OF DVT (DEEP VEIN THROMBOSIS): ICD-10-CM

## 2025-06-17 DIAGNOSIS — F33.0 MILD EPISODE OF RECURRENT MAJOR DEPRESSIVE DISORDER: ICD-10-CM

## 2025-06-17 DIAGNOSIS — Z30.011 ENCOUNTER FOR INITIAL PRESCRIPTION OF CONTRACEPTIVE PILLS: ICD-10-CM

## 2025-06-17 DIAGNOSIS — D68.51 HETEROZYGOUS FACTOR V LEIDEN MUTATION: ICD-10-CM

## 2025-06-17 DIAGNOSIS — Z00.00 ANNUAL PHYSICAL EXAM: Primary | ICD-10-CM

## 2025-06-17 DIAGNOSIS — R06.81 APNEA: ICD-10-CM

## 2025-06-17 PROBLEM — Z30.09 BIRTH CONTROL COUNSELING: Status: RESOLVED | Noted: 2022-09-29 | Resolved: 2025-06-17

## 2025-06-17 PROBLEM — F32.A DEPRESSION: Status: ACTIVE | Noted: 2025-06-17

## 2025-06-17 PROCEDURE — 99213 OFFICE O/P EST LOW 20 MIN: CPT | Performed by: STUDENT IN AN ORGANIZED HEALTH CARE EDUCATION/TRAINING PROGRAM

## 2025-06-17 PROCEDURE — 99395 PREV VISIT EST AGE 18-39: CPT | Performed by: STUDENT IN AN ORGANIZED HEALTH CARE EDUCATION/TRAINING PROGRAM

## 2025-06-17 RX ORDER — BUPROPION HYDROCHLORIDE 150 MG/1
150 TABLET ORAL DAILY
Qty: 90 TABLET | Refills: 1 | Status: SHIPPED | OUTPATIENT
Start: 2025-06-17

## 2025-06-17 RX ORDER — DROSPIRENONE 4 MG/1
1 TABLET, FILM COATED ORAL DAILY
Qty: 84 TABLET | Refills: 1 | Status: SHIPPED | OUTPATIENT
Start: 2025-06-17

## 2025-06-17 NOTE — PROGRESS NOTES
Office Note     Name: Ledy Barrera    : 1995     MRN: [id number]     Chief Complaint  Annual Exam (Physical) and Apnea    Subjective     History of Present Illness:  Ledy Barrera is a 29 y.o. female who presents today for chronic conditions    Hyperglycemia: diet controlled  Factor V Leiden on drosperinone for birth control, follows gynecology  Vitamin D deficiency on vitamin D and multivitamins daily  Follow-up patient seborrhea on minoxidil follows with dermatology, not on biotin     Pap smear 2023 follows with gynecology    Also complains of depression, doing well on wellbutrin 150 getting this from online provider. Asks to take over slynd OCP due to gynecology availability  Also complains of apneic symptoms when asleep. No snoring, she is obese BMI 38    Family History:   Family History   Problem Relation Age of Onset    Cervical cancer Mother     Uterine cancer Mother     Cancer Mother         Uterine    Diabetes Father         Type 2    Hypertension Father     Other Father         NON ALCOHOL FATTY LIVER    Hyperlipidemia Father     Other Brother         Seizures    No Known Problems Maternal Grandmother     Breast cancer Paternal Grandmother 50    Cirrhosis Paternal Grandmother     Cancer Paternal Grandmother         Breast    Other Maternal Aunt         Lupus    Stroke Maternal Grandfather     Cancer Paternal Grandfather         Lung       Social History:   Social History     Socioeconomic History    Marital status: Significant Other    Number of children: 0    Highest education level: Bachelor's degree (e.g., BA, AB, BS)   Tobacco Use    Smoking status: Never    Smokeless tobacco: Never   Vaping Use    Vaping status: Never Used   Substance and Sexual Activity    Alcohol use: Not Currently     Comment: occas    Drug use: Never    Sexual activity: Yes     Partners: Male     Birth control/protection: Bilateral salpingectomy        Health Maintenance   Topic Date Due    ANNUAL PHYSICAL  " 01/09/2025    TDAP/TD VACCINES (1 - Tdap) 12/14/2025 (Originally 9/26/2016)    INFLUENZA VACCINE  07/01/2025    PAP SMEAR  02/23/2026    HEPATITIS C SCREENING  Completed    COVID-19 Vaccine  Completed    Pneumococcal Vaccine 0-49  Aged Out       Patient Care Team:  Amie Webb MD as PCP - General (Family Medicine)    Objective     Vital Signs  /83   Pulse 101   Temp 98.1 °F (36.7 °C) (Infrared)   Resp 18   Ht 160 cm (62.99\")   Wt 99.8 kg (220 lb)   SpO2 100%   BMI 38.98 kg/m²   Estimated body mass index is 38.98 kg/m² as calculated from the following:    Height as of this encounter: 160 cm (62.99\").    Weight as of this encounter: 99.8 kg (220 lb).  Class 2 Severe Obesity (BMI >=35 and <=39.9). Obesity-related health conditions include the following: none. Obesity is unchanged. BMI is is above average; BMI management plan is completed. We discussed portion control and increasing exercise.    Physical Exam  Vitals and nursing note reviewed.   Constitutional:       Appearance: Normal appearance. She is obese.   HENT:      Head: Normocephalic and atraumatic.   Cardiovascular:      Rate and Rhythm: Normal rate and regular rhythm.      Pulses: Normal pulses.      Heart sounds: Normal heart sounds.   Pulmonary:      Effort: Pulmonary effort is normal. No respiratory distress.      Breath sounds: Normal breath sounds. No wheezing, rhonchi or rales.   Abdominal:      General: Abdomen is flat. Bowel sounds are normal.      Palpations: Abdomen is soft.      Tenderness: There is no abdominal tenderness. There is no guarding.   Musculoskeletal:      Cervical back: Neck supple.   Skin:     General: Skin is warm.      Capillary Refill: Capillary refill takes less than 2 seconds.   Neurological:      General: No focal deficit present.      Mental Status: She is alert. Mental status is at baseline.   Psychiatric:         Mood and Affect: Mood normal.         Behavior: Behavior normal.          Procedures "     Assessment and Plan     Diagnoses and all orders for this visit:    1. Annual physical exam (Primary)  -     CBC (No Diff)  -     Basic Metabolic Panel  -     Lipid Panel  -     TSH Rfx On Abnormal To Free T4  -     Hemoglobin A1c    2. Hyperglycemia  Assessment & Plan:  Will continue to monitor A1c not on medications diet controlled    Orders:  -     Hemoglobin A1c    3. History of DVT (deep vein thrombosis)  -     Drospirenone (Slynd) 4 MG tablet; Take 1 tablet by mouth Daily.  Dispense: 84 tablet; Refill: 1  -     Ambulatory Referral to Gynecology    4. Encounter for initial prescription of contraceptive pills  Assessment & Plan:  Has been on non estrogen OCPs, doing well on slynd    Orders:  -     Drospirenone (Slynd) 4 MG tablet; Take 1 tablet by mouth Daily.  Dispense: 84 tablet; Refill: 1  -     Ambulatory Referral to Gynecology    5. Heterozygous factor V Leiden mutation  Assessment & Plan:  Stable, will continue to follow and monitor labs    Orders:  -     Drospirenone (Slynd) 4 MG tablet; Take 1 tablet by mouth Daily.  Dispense: 84 tablet; Refill: 1  -     Ambulatory Referral to Gynecology    6. Mild episode of recurrent major depressive disorder  Assessment & Plan:  Stable on current medication and dosage. Will continue current management. Refill medication as necessary.  Wellbutrin 150mg daily    Orders:  -     buPROPion XL (WELLBUTRIN XL) 150 MG 24 hr tablet; Take 1 tablet by mouth Daily.  Dispense: 90 tablet; Refill: 1    7. Apnea  Assessment & Plan:  Refer to sleep medicine    Orders:  -     Ambulatory Referral to Sleep Medicine         Counseling was given to patient for the following topics: instructions for management, risks and benefits of treatment options, and importance of treatment compliance.    Follow Up  Return in about 6 months (around 12/17/2025) for 6 month follow up.    MD OBED Liu MR  [facility]  [address] [place] [address]  [address]  [facility] [address] [phone]

## 2025-06-17 NOTE — ASSESSMENT & PLAN NOTE
Stable on current medication and dosage. Will continue current management. Refill medication as necessary.  Wellbutrin 150mg daily

## 2025-06-18 LAB
BUN SERPL-MCNC: 9 MG/DL (ref 6–20)
BUN/CREAT SERPL: 12.5 (ref 7–25)
CALCIUM SERPL-MCNC: 8.9 MG/DL (ref 8.6–10.5)
CHLORIDE SERPL-SCNC: 107 MMOL/L (ref 98–107)
CHOLEST SERPL-MCNC: 145 MG/DL (ref 0–200)
CO2 SERPL-SCNC: 22.8 MMOL/L (ref 22–29)
CREAT SERPL-MCNC: 0.72 MG/DL (ref 0.57–1)
EGFRCR SERPLBLD CKD-EPI 2021: 117.7 ML/MIN/1.73
ERYTHROCYTE [DISTWIDTH] IN BLOOD BY AUTOMATED COUNT: 12.8 % (ref 12.3–15.4)
GLUCOSE SERPL-MCNC: 82 MG/DL (ref 65–99)
HBA1C MFR BLD: 5.2 % (ref 4.8–5.6)
HCT VFR BLD AUTO: 42 % (ref 34–46.6)
HDLC SERPL-MCNC: 50 MG/DL (ref 40–60)
HGB BLD-MCNC: 13.4 G/DL (ref 12–15.9)
LDLC SERPL CALC-MCNC: 80 MG/DL (ref 0–100)
MCH RBC QN AUTO: 28.6 PG (ref 26.6–33)
MCHC RBC AUTO-ENTMCNC: 31.9 G/DL (ref 31.5–35.7)
MCV RBC AUTO: 89.6 FL (ref 79–97)
PLATELET # BLD AUTO: 293 10*3/MM3 (ref 140–450)
POTASSIUM SERPL-SCNC: 3.9 MMOL/L (ref 3.5–5.2)
RBC # BLD AUTO: 4.69 10*6/MM3 (ref 3.77–5.28)
SODIUM SERPL-SCNC: 142 MMOL/L (ref 136–145)
TRIGL SERPL-MCNC: 76 MG/DL (ref 0–150)
TSH SERPL DL<=0.005 MIU/L-ACNC: 2.49 UIU/ML (ref 0.27–4.2)
VLDLC SERPL CALC-MCNC: 15 MG/DL (ref 5–40)
WBC # BLD AUTO: 9.21 10*3/MM3 (ref 3.4–10.8)